# Patient Record
Sex: FEMALE | NOT HISPANIC OR LATINO
[De-identification: names, ages, dates, MRNs, and addresses within clinical notes are randomized per-mention and may not be internally consistent; named-entity substitution may affect disease eponyms.]

---

## 2017-10-02 ENCOUNTER — CHARTING TRANS (OUTPATIENT)
Dept: OTHER | Age: 27
End: 2017-10-02

## 2017-10-23 ENCOUNTER — CHARTING TRANS (OUTPATIENT)
Dept: OTHER | Age: 27
End: 2017-10-23

## 2017-10-24 ENCOUNTER — CHARTING TRANS (OUTPATIENT)
Dept: OTHER | Age: 27
End: 2017-10-24

## 2017-11-07 ENCOUNTER — LAB SERVICES (OUTPATIENT)
Dept: OTHER | Age: 27
End: 2017-11-07

## 2017-11-07 ENCOUNTER — CHARTING TRANS (OUTPATIENT)
Dept: OTHER | Age: 27
End: 2017-11-07

## 2017-11-14 LAB — AP REPORT: NORMAL

## 2018-01-22 ENCOUNTER — CHARTING TRANS (OUTPATIENT)
Dept: OTHER | Age: 28
End: 2018-01-22

## 2018-11-27 VITALS
BODY MASS INDEX: 22.34 KG/M2 | SYSTOLIC BLOOD PRESSURE: 118 MMHG | DIASTOLIC BLOOD PRESSURE: 78 MMHG | WEIGHT: 139 LBS | HEIGHT: 66 IN

## 2021-03-11 LAB — AMB EXT TREPONEMAL ANTIBODIES: NONREACTIVE

## 2021-05-20 ENCOUNTER — TELEPHONE (OUTPATIENT)
Dept: OBGYN UNIT | Facility: HOSPITAL | Age: 31
End: 2021-05-20

## 2021-05-21 ENCOUNTER — LAB ENCOUNTER (OUTPATIENT)
Dept: LAB | Age: 31
End: 2021-05-21
Attending: OBSTETRICS & GYNECOLOGY
Payer: COMMERCIAL

## 2021-05-21 DIAGNOSIS — Z34.80 SUPERVISION OF OTHER NORMAL PREGNANCY: ICD-10-CM

## 2021-05-24 ENCOUNTER — ANESTHESIA EVENT (OUTPATIENT)
Dept: OBGYN UNIT | Facility: HOSPITAL | Age: 31
End: 2021-05-24
Payer: COMMERCIAL

## 2021-05-24 ENCOUNTER — HOSPITAL ENCOUNTER (INPATIENT)
Facility: HOSPITAL | Age: 31
LOS: 2 days | Discharge: HOME | End: 2021-05-26
Attending: OBSTETRICS & GYNECOLOGY | Admitting: OBSTETRICS & GYNECOLOGY
Payer: COMMERCIAL

## 2021-05-24 ENCOUNTER — APPOINTMENT (OUTPATIENT)
Dept: OBGYN CLINIC | Facility: HOSPITAL | Age: 31
End: 2021-05-24
Attending: OBSTETRICS & GYNECOLOGY
Payer: COMMERCIAL

## 2021-05-24 ENCOUNTER — ANESTHESIA (OUTPATIENT)
Dept: OBGYN UNIT | Facility: HOSPITAL | Age: 31
End: 2021-05-24
Payer: COMMERCIAL

## 2021-05-24 ENCOUNTER — TELEPHONE (OUTPATIENT)
Dept: OBGYN UNIT | Facility: HOSPITAL | Age: 31
End: 2021-05-24

## 2021-05-24 PROBLEM — Z34.90 PREGNANCY: Status: ACTIVE | Noted: 2021-05-24

## 2021-05-24 PROBLEM — Z34.90 PREGNANCY (HCC): Status: ACTIVE | Noted: 2021-05-24

## 2021-05-24 PROCEDURE — 86901 BLOOD TYPING SEROLOGIC RH(D): CPT | Performed by: OBSTETRICS & GYNECOLOGY

## 2021-05-24 PROCEDURE — 0KQM0ZZ REPAIR PERINEUM MUSCLE, OPEN APPROACH: ICD-10-PCS | Performed by: OBSTETRICS & GYNECOLOGY

## 2021-05-24 PROCEDURE — 86900 BLOOD TYPING SEROLOGIC ABO: CPT | Performed by: OBSTETRICS & GYNECOLOGY

## 2021-05-24 PROCEDURE — 3E033VJ INTRODUCTION OF OTHER HORMONE INTO PERIPHERAL VEIN, PERCUTANEOUS APPROACH: ICD-10-PCS | Performed by: OBSTETRICS & GYNECOLOGY

## 2021-05-24 PROCEDURE — 85025 COMPLETE CBC W/AUTO DIFF WBC: CPT | Performed by: OBSTETRICS & GYNECOLOGY

## 2021-05-24 PROCEDURE — 86850 RBC ANTIBODY SCREEN: CPT | Performed by: OBSTETRICS & GYNECOLOGY

## 2021-05-24 PROCEDURE — 10907ZC DRAINAGE OF AMNIOTIC FLUID, THERAPEUTIC FROM PRODUCTS OF CONCEPTION, VIA NATURAL OR ARTIFICIAL OPENING: ICD-10-PCS | Performed by: OBSTETRICS & GYNECOLOGY

## 2021-05-24 RX ORDER — ACETAMINOPHEN 500 MG
500 TABLET ORAL EVERY 6 HOURS PRN
Status: DISCONTINUED | OUTPATIENT
Start: 2021-05-24 | End: 2021-05-24

## 2021-05-24 RX ORDER — DOCUSATE SODIUM 100 MG/1
100 CAPSULE, LIQUID FILLED ORAL 2 TIMES DAILY
Status: DISCONTINUED | OUTPATIENT
Start: 2021-05-25 | End: 2021-05-26

## 2021-05-24 RX ORDER — DEXTROSE, SODIUM CHLORIDE, SODIUM LACTATE, POTASSIUM CHLORIDE, AND CALCIUM CHLORIDE 5; .6; .31; .03; .02 G/100ML; G/100ML; G/100ML; G/100ML; G/100ML
INJECTION, SOLUTION INTRAVENOUS CONTINUOUS
Status: DISCONTINUED | OUTPATIENT
Start: 2021-05-24 | End: 2021-05-24 | Stop reason: HOSPADM

## 2021-05-24 RX ORDER — LIDOCAINE HYDROCHLORIDE 10 MG/ML
INJECTION, SOLUTION INFILTRATION; PERINEURAL
Status: COMPLETED | OUTPATIENT
Start: 2021-05-24 | End: 2021-05-24

## 2021-05-24 RX ORDER — DIAPER,BRIEF,INFANT-TODD,DISP
1 EACH MISCELLANEOUS EVERY 6 HOURS PRN
Status: DISCONTINUED | OUTPATIENT
Start: 2021-05-24 | End: 2021-05-26

## 2021-05-24 RX ORDER — IBUPROFEN 600 MG/1
600 TABLET ORAL EVERY 6 HOURS PRN
Status: DISCONTINUED | OUTPATIENT
Start: 2021-05-24 | End: 2021-05-24

## 2021-05-24 RX ORDER — ONDANSETRON 2 MG/ML
4 INJECTION INTRAMUSCULAR; INTRAVENOUS EVERY 6 HOURS PRN
Status: DISCONTINUED | OUTPATIENT
Start: 2021-05-24 | End: 2021-05-24

## 2021-05-24 RX ORDER — BISACODYL 10 MG
10 SUPPOSITORY, RECTAL RECTAL ONCE AS NEEDED
Status: DISCONTINUED | OUTPATIENT
Start: 2021-05-24 | End: 2021-05-26

## 2021-05-24 RX ORDER — BUPIVACAINE HCL/0.9 % NACL/PF 0.25 %
5 PLASTIC BAG, INJECTION (ML) EPIDURAL AS NEEDED
Status: DISCONTINUED | OUTPATIENT
Start: 2021-05-24 | End: 2021-05-24

## 2021-05-24 RX ORDER — CHOLECALCIFEROL (VITAMIN D3) 25 MCG
1 TABLET,CHEWABLE ORAL DAILY
Status: DISCONTINUED | OUTPATIENT
Start: 2021-05-25 | End: 2021-05-26

## 2021-05-24 RX ORDER — NALBUPHINE HCL 10 MG/ML
2.5 AMPUL (ML) INJECTION
Status: DISCONTINUED | OUTPATIENT
Start: 2021-05-24 | End: 2021-05-24

## 2021-05-24 RX ORDER — BUPIVACAINE HYDROCHLORIDE 2.5 MG/ML
20 INJECTION, SOLUTION EPIDURAL; INFILTRATION; INTRACAUDAL ONCE
Status: DISCONTINUED | OUTPATIENT
Start: 2021-05-24 | End: 2021-05-24 | Stop reason: HOSPADM

## 2021-05-24 RX ORDER — AMMONIA INHALANTS 0.04 G/.3ML
0.3 INHALANT RESPIRATORY (INHALATION) AS NEEDED
Status: DISCONTINUED | OUTPATIENT
Start: 2021-05-24 | End: 2021-05-24 | Stop reason: HOSPADM

## 2021-05-24 RX ORDER — TRISODIUM CITRATE DIHYDRATE AND CITRIC ACID MONOHYDRATE 500; 334 MG/5ML; MG/5ML
30 SOLUTION ORAL AS NEEDED
Status: DISCONTINUED | OUTPATIENT
Start: 2021-05-24 | End: 2021-05-24 | Stop reason: HOSPADM

## 2021-05-24 RX ORDER — IBUPROFEN 600 MG/1
600 TABLET ORAL EVERY 6 HOURS
Status: DISCONTINUED | OUTPATIENT
Start: 2021-05-24 | End: 2021-05-24

## 2021-05-24 RX ORDER — ACETAMINOPHEN 325 MG/1
650 TABLET ORAL EVERY 6 HOURS PRN
Status: DISCONTINUED | OUTPATIENT
Start: 2021-05-24 | End: 2021-05-26

## 2021-05-24 RX ORDER — LIDOCAINE HYDROCHLORIDE 10 MG/ML
30 INJECTION, SOLUTION EPIDURAL; INFILTRATION; INTRACAUDAL; PERINEURAL ONCE
Status: DISCONTINUED | OUTPATIENT
Start: 2021-05-24 | End: 2021-05-24 | Stop reason: HOSPADM

## 2021-05-24 RX ORDER — SODIUM CHLORIDE, SODIUM LACTATE, POTASSIUM CHLORIDE, CALCIUM CHLORIDE 600; 310; 30; 20 MG/100ML; MG/100ML; MG/100ML; MG/100ML
INJECTION, SOLUTION INTRAVENOUS AS NEEDED
Status: DISCONTINUED | OUTPATIENT
Start: 2021-05-24 | End: 2021-05-24 | Stop reason: HOSPADM

## 2021-05-24 RX ORDER — AMMONIA INHALANTS 0.04 G/.3ML
0.3 INHALANT RESPIRATORY (INHALATION) AS NEEDED
Status: DISCONTINUED | OUTPATIENT
Start: 2021-05-24 | End: 2021-05-26

## 2021-05-24 RX ORDER — SIMETHICONE 80 MG
80 TABLET,CHEWABLE ORAL 3 TIMES DAILY PRN
Status: DISCONTINUED | OUTPATIENT
Start: 2021-05-24 | End: 2021-05-26

## 2021-05-24 RX ORDER — ONDANSETRON 2 MG/ML
4 INJECTION INTRAMUSCULAR; INTRAVENOUS EVERY 6 HOURS PRN
Status: DISCONTINUED | OUTPATIENT
Start: 2021-05-24 | End: 2021-05-26

## 2021-05-24 RX ORDER — IBUPROFEN 600 MG/1
600 TABLET ORAL EVERY 6 HOURS PRN
Status: DISCONTINUED | OUTPATIENT
Start: 2021-05-24 | End: 2021-05-26

## 2021-05-24 RX ORDER — LIDOCAINE HYDROCHLORIDE AND EPINEPHRINE 15; 5 MG/ML; UG/ML
INJECTION, SOLUTION EPIDURAL
Status: COMPLETED | OUTPATIENT
Start: 2021-05-24 | End: 2021-05-24

## 2021-05-24 RX ORDER — BUPIVACAINE HYDROCHLORIDE 2.5 MG/ML
INJECTION, SOLUTION EPIDURAL; INFILTRATION; INTRACAUDAL
Status: COMPLETED | OUTPATIENT
Start: 2021-05-24 | End: 2021-05-24

## 2021-05-24 RX ORDER — TERBUTALINE SULFATE 1 MG/ML
0.25 INJECTION, SOLUTION SUBCUTANEOUS AS NEEDED
Status: DISCONTINUED | OUTPATIENT
Start: 2021-05-24 | End: 2021-05-24 | Stop reason: HOSPADM

## 2021-05-24 RX ORDER — DOCUSATE SODIUM 100 MG/1
100 CAPSULE, LIQUID FILLED ORAL
Status: DISCONTINUED | OUTPATIENT
Start: 2021-05-24 | End: 2021-05-24

## 2021-05-24 RX ADMIN — LIDOCAINE HYDROCHLORIDE 5 ML: 10 INJECTION, SOLUTION INFILTRATION; PERINEURAL at 11:45:00

## 2021-05-24 RX ADMIN — BUPIVACAINE HYDROCHLORIDE 10 ML: 2.5 INJECTION, SOLUTION EPIDURAL; INFILTRATION; INTRACAUDAL at 11:45:00

## 2021-05-24 RX ADMIN — LIDOCAINE HYDROCHLORIDE AND EPINEPHRINE 5 ML: 15; 5 INJECTION, SOLUTION EPIDURAL at 11:45:00

## 2021-05-24 NOTE — PROGRESS NOTES
Pt is a 27year old female admitted to [facility]  . Patient presents with:  Scheduled Induction: Elective     Pt is  39w0d intra-uterine pregnancy. History obtained, consents signed. Oriented to room, staff, and plan of care.

## 2021-05-24 NOTE — ANESTHESIA PREPROCEDURE EVALUATION
Anesthesia PreOp Note    HPI:     Skinny Augustine is a 27year old female who presents for preoperative consultation requested by: * No surgeons listed *    Date of Surgery: 5/24/2021    * No procedures listed *  Indication: * No pre-op diagnosis entere Stopped at 05/24/21 0815  fentaNYL citrate (SUBLIMAZE) 0.05 MG/ML injection 50 mcg, 50 mcg, Intravenous, Q30 Min PRN, Bonnie Sullivan MD  oxyTOCIN (PITOCIN) 30 units/ 500 ml 0.9% NS premix infusion, 0.5-20 dipak-units/min, Intravenous, Continuous, Rodríguez Needs:       Lack of Transportation (Medical):       Lack of Transportation (Non-Medical):   Physical Activity:       Days of Exercise per Week:       Minutes of Exercise per Session:   Stress:       Feeling of Stress :   Social Connections:       Luma Velasquez Plan:   Epidural  Post-op Pain Management: Oral pain medication  Informed Consent Plan and Risks Discussed With:  Patient  Use of Blood Products Discussed With:  Patient  Blood Product Use Consented    Discussed plan with:  Surgeon      I have informed C

## 2021-05-24 NOTE — H&P
[address]  [address] JOCELYN Penny Patient Status:  Inpatient    10/25/1990 MRN [id number]   Location [address] Attending Lacey Rodriguez MD   Hosp Day # 0 PCP None Pcp     Date of Admiss (from the past 24 hour(s)). No results found. Assessment/Plan:   IUP 39w0d for IOL  Obstetrical history significant for IOL  Treatment Plan:  Ambulate, monitored., Expectant management. and Anticipate vaginal delivery. Pitocin IOL.  Epidural when

## 2021-05-24 NOTE — ANESTHESIA PROCEDURE NOTES
Labor Analgesia    Date/Time: 5/24/2021 11:45 AM  Performed by: Dianne Dejesus MD  Authorized by: Dianne Dejesus MD       General Information and Staff    Start Time:  5/24/2021 11:35 AM  End Time:  5/24/2021 11:45 AM  Anesthesiologist:  Joanne Guallpa

## 2021-05-25 PROCEDURE — 85025 COMPLETE CBC W/AUTO DIFF WBC: CPT | Performed by: OBSTETRICS & GYNECOLOGY

## 2021-05-25 NOTE — PROGRESS NOTES
[facility]    OB/GYNE Progress Note      Don James Patient Status:  Inpatient    10/25/1990 MRN [id number]   Location [facility] 3SE Attending Garry Betancorut MD   Hosp Day # 1 PCP None Pcp       Assessment/Plan       A

## 2021-05-25 NOTE — PLAN OF CARE
Problem: PAIN - ADULT  Goal: Verbalizes/displays adequate comfort level or patient's stated pain goal  Description: INTERVENTIONS:  - Encourage pt to monitor pain and request assistance  - Assess pain using appropriate pain scale  - Administer analgesics INTERVENTIONS:  - Assess and monitor vital signs and lab values. - Assess fundus and lochia. - Provide ice/sitz baths for perineum discomfort.   - Monitor healing of incision/episiotomy/laceration, and assess for signs and symptoms of infection and hemato expression (breast pumping) and storage of breast milk. Provide pumping equipment/supplies, instructions and assistance, as needed. - Encourage rooming-in and breast feeding on demand.  - Encourage skin-to-skin contact. - Provide LC support as needed.   -

## 2021-05-25 NOTE — PROGRESS NOTES
Patient received into room  351. Bedside report received from  [address] [place] [address]. Patient transferred to bed from cart. Bed in locked and low position. Side rails x2 up. Vital signs within normal limits, fundus firm U/U, lochia  small, no clots noted.  IV site [place]

## 2021-05-25 NOTE — L&D DELIVERY NOTE
Nahid Dover, Girl [[id number]]    Labor Events     labor?: No   steroids?: None  Antibiotics received during labor?: No  Antibiotics (enter # doses in comment): none  Rupture date/time: 2021 1229     Rupture type: SROM, AROM  Fluid color: Grimace Cry or active withdrawal    Muscle tone Limp Some flexion Active motion    Respiratory effort Absent Weak cry; hypoventilation Good, crying              1 Minute:  5 Minute:  10 Minute:  15 Minute:  20 Minute:    Skin color: 1  1       Heart rate: Verified    Maternal Anesthesia: epidural     Episiotomy/Laceration Repair  Laceration: perineal second degree    Delivery Complications  none    Neonatologist Present: no    Delivery Narrative: Patient pushed for 5 minutes prior to delivering a live femal

## 2021-05-25 NOTE — ANESTHESIA POSTPROCEDURE EVALUATION
Patient: Kristie Lundberg    Procedure Summary     Date: 05/24/21 Room / Location:     Anesthesia Start: [facility] Anesthesia Stop: 1819    Procedure: LABOR ANALGESIA Diagnosis:     Scheduled Providers:  Anesthesiologist: Tristin Hanson MD    Anesthesia

## 2021-05-25 NOTE — PROGRESS NOTES
Patient up to bathroom with assist x 2. Voided 250cc. Patient transferred to mother/baby room 351 per wheelchair in stable condition with baby and personal belongings. Accompanied by significant other and staff. Report given to [facility].

## 2021-05-26 VITALS
TEMPERATURE: 98 F | DIASTOLIC BLOOD PRESSURE: 73 MMHG | SYSTOLIC BLOOD PRESSURE: 109 MMHG | HEART RATE: 75 BPM | OXYGEN SATURATION: 100 % | RESPIRATION RATE: 16 BRPM

## 2021-05-26 RX ORDER — IBUPROFEN 600 MG/1
600 TABLET ORAL EVERY 6 HOURS PRN
Qty: 60 TABLET | Refills: 0 | Status: SHIPPED | OUTPATIENT
Start: 2021-05-26 | End: 2021-07-08

## 2021-05-26 RX ORDER — ACETAMINOPHEN 325 MG/1
650 TABLET ORAL EVERY 6 HOURS PRN
Qty: 60 TABLET | Refills: 0 | Status: SHIPPED | OUTPATIENT
Start: 2021-05-26 | End: 2021-07-08

## 2021-05-26 NOTE — PLAN OF CARE
Pain well controlled with motrin. Vitals wnl. Possible discharge tomorrow. Will continue plan of care.

## 2021-05-26 NOTE — PROGRESS NOTES
[place] [facility]    OB/GYNE Progress Note      [place] Patient Status:  Inpatient    10/25/1990 MRN [id number]   Location [facility] 3SE Attending Johnnie Gracia MD   Hosp Day # 2 PCP None Pcp       Assessment/Plan       A

## 2021-05-26 NOTE — DISCHARGE SUMMARY
[facility]    Discharge Summary    Gloria Hankins Patient Status:  Inpatient    10/25/1990 MRN [id number]   Location [facility] 3SE Attending Radha Felipe MD   Hosp Day # 2 PCP None Pcp     Date of Admission: 2021

## 2021-05-26 NOTE — PLAN OF CARE
Problem: POSTPARTUM  Goal: Long Term Goal:Experiences normal postpartum course  Description: INTERVENTIONS:  - Assess and monitor vital signs and lab values. - Assess fundus and lochia. - Provide ice/sitz baths for perineum discomfort.   - Monitor heali for signs of nipple pain/trauma. - Instruct and provide assistance with proper latch. - Review techniques for milk expression (breast pumping) and storage of breast milk. Provide pumping equipment/supplies, instructions and assistance, as needed.   Leighton Henry

## 2021-06-07 ENCOUNTER — TELEPHONE (OUTPATIENT)
Dept: OBGYN UNIT | Facility: HOSPITAL | Age: 31
End: 2021-06-07

## 2023-07-28 ENCOUNTER — OFFICE VISIT (OUTPATIENT)
Dept: OBGYN CLINIC | Facility: CLINIC | Age: 33
End: 2023-07-28

## 2023-07-28 VITALS
HEART RATE: 101 BPM | DIASTOLIC BLOOD PRESSURE: 69 MMHG | HEIGHT: 66 IN | BODY MASS INDEX: 24.11 KG/M2 | SYSTOLIC BLOOD PRESSURE: 102 MMHG | WEIGHT: 150 LBS

## 2023-07-28 DIAGNOSIS — N92.6 MISSED MENSES: Primary | ICD-10-CM

## 2023-07-28 PROBLEM — Z78.9 NO KNOWN PROBLEMS: Status: ACTIVE | Noted: 2023-07-28

## 2023-07-28 LAB
CONTROL LINE PRESENT WITH A CLEAR BACKGROUND (YES/NO): YES YES/NO
PREGNANCY TEST, URINE: POSITIVE

## 2023-07-28 PROCEDURE — 3078F DIAST BP <80 MM HG: CPT | Performed by: ADVANCED PRACTICE MIDWIFE

## 2023-07-28 PROCEDURE — 3074F SYST BP LT 130 MM HG: CPT | Performed by: ADVANCED PRACTICE MIDWIFE

## 2023-07-28 PROCEDURE — 81025 URINE PREGNANCY TEST: CPT | Performed by: ADVANCED PRACTICE MIDWIFE

## 2023-07-28 PROCEDURE — 99203 OFFICE O/P NEW LOW 30 MIN: CPT | Performed by: ADVANCED PRACTICE MIDWIFE

## 2023-07-28 PROCEDURE — 3008F BODY MASS INDEX DOCD: CPT | Performed by: ADVANCED PRACTICE MIDWIFE

## 2023-07-28 RX ORDER — CHOLECALCIFEROL (VITAMIN D3) 25 MCG
1 TABLET,CHEWABLE ORAL DAILY
COMMUNITY

## 2023-07-28 NOTE — PROGRESS NOTES
Patient seen in conjunction with [facility]. I personally witnessed the patient's exam and medical decision making on this date of service. I was physically present in the room for the performance of the E/M service. I have reviewed the CNM student's documentation and findings including history, Exam, and Medical Decision Making, edited the document for accuracy and verify that it represents the clinical findings and services performed.

## 2023-07-28 NOTE — PROGRESS NOTES
CHIEF COMPLAINT:  Patient presents with:  Consult: Missed menses , LMP  23, CLIFF 24        HPI:  Franciso Aschoff is 28year old, female, here today for a missed menses visit. Currently, she is feeling well. Denies 1st trimester danger signs. She is interested to learn about the midwife model of care as she previously saw an OB GYN for her past two pregnancies and births. Desires non-interventive birth if possible. Patient's last menstrual period was 2023.   Menarche: 15  Period Cycle (Days): 28 DAYS  Period Duration (Days): 6 DAYS  Period Flow: MODERATE  Use of Birth Control (if yes, specify type): None  Hx Prior Abnormal Pap: No  Pap Date: 20  Pap Result Notes: NORMAL RESULTS       3/27/2019-39w2d 7#4oz, vaginal, epidural  2021-39w0d 6#8oz , vaginal, epidural  Current: Denies 1T danger signs  LMP 2023 --> 6w4d --> CLIFF , certain LMP, regular cycles    Denies history of gestational hypertension, diabetes, hemorrhage, or shoulder dystocia    /FOB: [place]   Family H/O of genetic disorders: No  Cats at home: No   Occupational hazzards: No  H/O of STIs: No  H/O of chicken pox or vaccine: Yes, vaccine  Exercise: Yes- Hot yoga three times per week  History of MRSA or other difficult to treat infections: No   Recent Travel outside [place] No    HISTORY:  Past Medical History:   Diagnosis Date    Precancerous skin lesion 2015      Past Surgical History:   Procedure Laterality Date    BIOPSY OF SKIN LESION        Family History   Problem Relation Age of Onset    Cancer Mother     Diabetes Maternal Grandfather       Social History:   Social History     Socioeconomic History    Marital status:    Tobacco Use    Smoking status: Never    Smokeless tobacco: Never   Vaping Use    Vaping Use: Never used   Substance and Sexual Activity    Alcohol use: Not Currently    Drug use: Never    Sexual activity: Yes       Safe relationship/safe home environment Medications (Active prior to today's visit):  Current Outpatient Medications   Medication Sig Dispense Refill    prenatal vitamin with DHA 27-0.8-228 MG Oral Cap Take 1 capsule by mouth daily. Levonorgestrel-Ethinyl Estrad (AVIANE) 0.1-20 MG-MCG Oral Tab Take 1 tablet by mouth daily. 90 tablet 5    cetirizine HCl 1 MG/ML Oral Solution Take 5 mg by mouth daily. Allergies:  No Known Allergies    REVIEW OF SYSTEMS:  Review of Systems   Constitutional: Negative. Respiratory: Negative. Endocrine: Negative. Genitourinary: Negative. Skin: Negative. Neurological: Negative. PHYSICAL EXAM:   07/28/23  1211   BP: 102/69   Pulse: 101     Physical Exam  Vitals reviewed. Constitutional:       Appearance: Normal appearance. HENT:      Head: Normocephalic. Pulmonary:      Effort: Pulmonary effort is normal.   Neurological:      Mental Status: She is alert and oriented to person, place, and time. Psychiatric:         Behavior: Behavior normal.         Thought Content: Thought content normal.         Judgment: Judgment normal.          Recent Results (from the past 24 hour(s))   URINE PREGNANCY TEST    Collection Time: 07/28/23 12:10 PM   Result Value Ref Range    Pregnancy Test, Urine POSITIVE     Control Line Present with a clear background (yes/no) YES Yes/No    Kit Lot # [id number] Numeric    Kit Expiration Date 07/12/24 Date        ASSESSMENT/PLAN:   Cami Rose was seen today for consult. Diagnoses and all orders for this visit:    Missed menses  -     URINE PREGNANCY TEST       Today's UCG positive result reviewed with patient  Appropriate for CNM care   Already taking prenatal vitamins  Baby ASA not indicated.  Low risk for pre-eclampsia    Next visit: Patient to schedule Nurse Education visit, next available, and NOB for 12wga    Counseling included:  -- CNM care, philosophy, and protocols  -- Discussed importance of folic acid, calcium, vitamin D  -- Reviewed pregnancy recommendations regarding weight gain, diet/hydration, and food safety  -- Travel discussed, avoid travel to zika zones, use of support stockings with flights longer than 3 hours, movement every 2-3 hours if driving  -- Discussed exercise  -- Discussed avoidance of Jacuzzis, saunas, hot tubs and steam rooms  -- Discussed avoidance of alcohol, smoking, and minimizing caffeine  -- Warning signs reviewed. Advised to call office if occur. Safe use of [employer] for messaging  -- Reviewed genetic/chromosomal screening guidelines for pregnancies, patient plans for First Trimester Screening  -- Schedule RN OB ED visit   -- 30 minutes face to face counseling, chart review, orders and coordination of care    Pt verbalized understanding. All questions answered. No barriers to learning identified      WILMAR Newell under direct supervision of Sabino Underwood CNM. Patient seen in conjunction with WILMAR. I personally witnessed the patient's exam and medical decision making on this date of service. I was physically present in the room for the performance of the E/M service. I have reviewed the MANDI student's documentation and findings including history, Exam, and Medical Decision Making, edited the document for accuracy and verify that it represents the clinical findings and services performed.

## 2023-08-15 ENCOUNTER — NURSE ONLY (OUTPATIENT)
Dept: OBGYN CLINIC | Facility: CLINIC | Age: 33
End: 2023-08-15

## 2023-08-15 ENCOUNTER — TELEPHONE (OUTPATIENT)
Dept: OBGYN CLINIC | Facility: CLINIC | Age: 33
End: 2023-08-15

## 2023-08-15 DIAGNOSIS — Z34.81 ENCOUNTER FOR SUPERVISION OF OTHER NORMAL PREGNANCY IN FIRST TRIMESTER: Primary | ICD-10-CM

## 2023-08-15 NOTE — PROGRESS NOTES
Phone Nurse Education Completed  PT verbalized understanding     Orders placed for new OB labs       BMI-20.3  Genetic Testing-  Desires first trimester screen with mfm/ NIPT  Circumcision- Desires  Feeding- Undecided  Transfusion- Agreeable  EPDS-0  Type of birth-  Desires no epidural  How [place] [facility]-  Facebook Group    Consults Placed- MFM     Pt. Has answered NO 5P questions and has NO  risk factors. Pt. Given What pregnant women need to know handout.

## 2023-09-04 ENCOUNTER — LAB ENCOUNTER (OUTPATIENT)
Dept: LAB | Facility: HOSPITAL | Age: 33
End: 2023-09-04
Attending: ADVANCED PRACTICE MIDWIFE
Payer: COMMERCIAL

## 2023-09-04 DIAGNOSIS — Z34.81 ENCOUNTER FOR SUPERVISION OF OTHER NORMAL PREGNANCY IN FIRST TRIMESTER: ICD-10-CM

## 2023-09-04 LAB
ANTIBODY SCREEN: NEGATIVE
BASOPHILS # BLD AUTO: 0.04 X10(3) UL (ref 0–0.2)
BASOPHILS NFR BLD AUTO: 0.5 %
DEPRECATED RDW RBC AUTO: 44.6 FL (ref 35.1–46.3)
EOSINOPHIL # BLD AUTO: 0.16 X10(3) UL (ref 0–0.7)
EOSINOPHIL NFR BLD AUTO: 2.1 %
ERYTHROCYTE [DISTWIDTH] IN BLOOD BY AUTOMATED COUNT: 13.3 % (ref 11–15)
HBV SURFACE AG SER-ACNC: <0.1 [IU]/L
HBV SURFACE AG SERPL QL IA: NONREACTIVE
HCT VFR BLD AUTO: 38.3 %
HCV AB SERPL QL IA: NONREACTIVE
HGB BLD-MCNC: 12.8 G/DL
IMM GRANULOCYTES # BLD AUTO: 0.03 X10(3) UL (ref 0–1)
IMM GRANULOCYTES NFR BLD: 0.4 %
LYMPHOCYTES # BLD AUTO: 1.51 X10(3) UL (ref 1–4)
LYMPHOCYTES NFR BLD AUTO: 19.6 %
MCH RBC QN AUTO: 30.7 PG (ref 26–34)
MCHC RBC AUTO-ENTMCNC: 33.4 G/DL (ref 31–37)
MCV RBC AUTO: 91.8 FL
MONOCYTES # BLD AUTO: 0.42 X10(3) UL (ref 0.1–1)
MONOCYTES NFR BLD AUTO: 5.5 %
NEUTROPHILS # BLD AUTO: 5.53 X10 (3) UL (ref 1.5–7.7)
NEUTROPHILS # BLD AUTO: 5.53 X10(3) UL (ref 1.5–7.7)
NEUTROPHILS NFR BLD AUTO: 71.9 %
PLATELET # BLD AUTO: 134 10(3)UL (ref 150–450)
RBC # BLD AUTO: 4.17 X10(6)UL
RH BLOOD TYPE: POSITIVE
RUBV IGG SER QL: POSITIVE
RUBV IGG SER-ACNC: 26.1 IU/ML (ref 10–?)
T PALLIDUM AB SER QL: NEGATIVE
VZV IGG SER IA-ACNC: 131.3 (ref 165–?)
WBC # BLD AUTO: 7.7 X10(3) UL (ref 4–11)

## 2023-09-04 PROCEDURE — 86780 TREPONEMA PALLIDUM: CPT

## 2023-09-04 PROCEDURE — 86900 BLOOD TYPING SEROLOGIC ABO: CPT

## 2023-09-04 PROCEDURE — 83020 HEMOGLOBIN ELECTROPHORESIS: CPT

## 2023-09-04 PROCEDURE — 36415 COLL VENOUS BLD VENIPUNCTURE: CPT

## 2023-09-04 PROCEDURE — 87086 URINE CULTURE/COLONY COUNT: CPT

## 2023-09-04 PROCEDURE — 86803 HEPATITIS C AB TEST: CPT

## 2023-09-04 PROCEDURE — 87340 HEPATITIS B SURFACE AG IA: CPT

## 2023-09-04 PROCEDURE — 86762 RUBELLA ANTIBODY: CPT

## 2023-09-04 PROCEDURE — 86787 VARICELLA-ZOSTER ANTIBODY: CPT

## 2023-09-04 PROCEDURE — 85025 COMPLETE CBC W/AUTO DIFF WBC: CPT

## 2023-09-04 PROCEDURE — 87389 HIV-1 AG W/HIV-1&-2 AB AG IA: CPT

## 2023-09-04 PROCEDURE — 86901 BLOOD TYPING SEROLOGIC RH(D): CPT

## 2023-09-04 PROCEDURE — 83021 HEMOGLOBIN CHROMOTOGRAPHY: CPT

## 2023-09-04 PROCEDURE — 86850 RBC ANTIBODY SCREEN: CPT

## 2023-09-05 PROBLEM — Z28.39 MATERNAL VARICELLA, NON-IMMUNE: Status: ACTIVE | Noted: 2023-09-05

## 2023-09-05 PROBLEM — O09.899 MATERNAL VARICELLA, NON-IMMUNE: Status: ACTIVE | Noted: 2023-09-05

## 2023-09-05 PROBLEM — Z28.39 MATERNAL VARICELLA, NON-IMMUNE (HCC): Status: ACTIVE | Noted: 2023-09-05

## 2023-09-05 PROBLEM — O09.899 MATERNAL VARICELLA, NON-IMMUNE (HCC): Status: ACTIVE | Noted: 2023-09-05

## 2023-09-05 PROBLEM — D69.6 THROMBOCYTOPENIA (HCC): Status: ACTIVE | Noted: 2023-09-05

## 2023-09-06 LAB
HGB A2 MFR BLD: 2.8 % (ref 1.5–3.5)
HGB PNL BLD ELPH: 97.2 % (ref 95.5–100)

## 2023-09-07 ENCOUNTER — INITIAL PRENATAL (OUTPATIENT)
Dept: OBGYN CLINIC | Facility: CLINIC | Age: 33
End: 2023-09-07

## 2023-09-07 VITALS
BODY MASS INDEX: 22 KG/M2 | WEIGHT: 136 LBS | HEART RATE: 105 BPM | DIASTOLIC BLOOD PRESSURE: 72 MMHG | SYSTOLIC BLOOD PRESSURE: 115 MMHG

## 2023-09-07 DIAGNOSIS — Z34.81 ENCOUNTER FOR SUPERVISION OF OTHER NORMAL PREGNANCY IN FIRST TRIMESTER: Primary | ICD-10-CM

## 2023-09-07 DIAGNOSIS — Z11.3 SCREEN FOR STD (SEXUALLY TRANSMITTED DISEASE): ICD-10-CM

## 2023-09-07 DIAGNOSIS — O26.859 SPOTTING DURING PREGNANCY: ICD-10-CM

## 2023-09-07 PROCEDURE — 3074F SYST BP LT 130 MM HG: CPT | Performed by: ADVANCED PRACTICE MIDWIFE

## 2023-09-07 PROCEDURE — 3078F DIAST BP <80 MM HG: CPT | Performed by: ADVANCED PRACTICE MIDWIFE

## 2023-09-08 ENCOUNTER — HOSPITAL ENCOUNTER (OUTPATIENT)
Dept: PERINATAL CARE | Facility: HOSPITAL | Age: 33
Discharge: HOME | End: 2023-09-08
Attending: ADVANCED PRACTICE MIDWIFE
Payer: COMMERCIAL

## 2023-09-08 ENCOUNTER — HOSPITAL ENCOUNTER (OUTPATIENT)
Dept: PERINATAL CARE | Facility: HOSPITAL | Age: 33
Discharge: HOME | End: 2023-09-08
Attending: OBSTETRICS & GYNECOLOGY
Payer: COMMERCIAL

## 2023-09-08 VITALS
WEIGHT: 136 LBS | DIASTOLIC BLOOD PRESSURE: 70 MMHG | BODY MASS INDEX: 22 KG/M2 | HEART RATE: 89 BPM | SYSTOLIC BLOOD PRESSURE: 107 MMHG

## 2023-09-08 DIAGNOSIS — D69.6 THROMBOCYTOPENIA (HCC): ICD-10-CM

## 2023-09-08 DIAGNOSIS — O99.112: Primary | ICD-10-CM

## 2023-09-08 DIAGNOSIS — Z36.9 FIRST TRIMESTER SCREENING: ICD-10-CM

## 2023-09-08 DIAGNOSIS — D69.6: Primary | ICD-10-CM

## 2023-09-08 LAB
C TRACH DNA SPEC QL NAA+PROBE: NEGATIVE
N GONORRHOEA DNA SPEC QL NAA+PROBE: NEGATIVE

## 2023-09-08 PROCEDURE — 36415 COLL VENOUS BLD VENIPUNCTURE: CPT

## 2023-09-08 PROCEDURE — 76813 OB US NUCHAL MEAS 1 GEST: CPT | Performed by: OBSTETRICS & GYNECOLOGY

## 2023-09-09 LAB
GENITAL VAGINOSIS SCREEN: NEGATIVE
TRICHOMONAS SCREEN: NEGATIVE

## 2023-09-09 RX ORDER — CLOTRIMAZOLE 1 %
1 CREAM WITH APPLICATOR VAGINAL DAILY
Qty: 45 G | Refills: 0 | Status: SHIPPED | OUTPATIENT
Start: 2023-09-09 | End: 2023-09-16

## 2023-09-18 ENCOUNTER — TELEPHONE (OUTPATIENT)
Dept: PERINATAL CARE | Facility: HOSPITAL | Age: 33
End: 2023-09-18

## 2023-10-03 ENCOUNTER — LAB ENCOUNTER (OUTPATIENT)
Dept: LAB | Facility: HOSPITAL | Age: 33
End: 2023-10-03
Attending: ADVANCED PRACTICE MIDWIFE
Payer: COMMERCIAL

## 2023-10-03 ENCOUNTER — ROUTINE PRENATAL (OUTPATIENT)
Dept: OBGYN CLINIC | Facility: CLINIC | Age: 33
End: 2023-10-03

## 2023-10-03 VITALS
BODY MASS INDEX: 22 KG/M2 | HEART RATE: 83 BPM | DIASTOLIC BLOOD PRESSURE: 63 MMHG | WEIGHT: 137.38 LBS | SYSTOLIC BLOOD PRESSURE: 93 MMHG

## 2023-10-03 DIAGNOSIS — Z34.81 ENCOUNTER FOR SUPERVISION OF OTHER NORMAL PREGNANCY IN FIRST TRIMESTER: Primary | ICD-10-CM

## 2023-10-03 DIAGNOSIS — Z28.21 INFLUENZA VACCINE REFUSED: ICD-10-CM

## 2023-10-03 DIAGNOSIS — Z34.81 ENCOUNTER FOR SUPERVISION OF OTHER NORMAL PREGNANCY IN FIRST TRIMESTER: ICD-10-CM

## 2023-10-03 PROBLEM — Z78.9 NO KNOWN PROBLEMS: Status: RESOLVED | Noted: 2023-07-28 | Resolved: 2023-10-03

## 2023-10-03 PROCEDURE — 3074F SYST BP LT 130 MM HG: CPT | Performed by: ADVANCED PRACTICE MIDWIFE

## 2023-10-03 PROCEDURE — 82105 ALPHA-FETOPROTEIN SERUM: CPT

## 2023-10-03 PROCEDURE — 3078F DIAST BP <80 MM HG: CPT | Performed by: ADVANCED PRACTICE MIDWIFE

## 2023-10-03 PROCEDURE — 36415 COLL VENOUS BLD VENIPUNCTURE: CPT

## 2023-10-03 NOTE — PROGRESS NOTES
Feeling some FM. Had NT which was normal and low risk CFDNA. AFP ordered. Declines Flu vaccine today. Warning signs reviewed. Has 20 wk sono scheduled. Low plt, reports had in other pregnancies as well and resolved after delivery.

## 2023-10-06 LAB
AFP MOM: 0.63
AFP VALUE: 23 NG/ML
GA ON COLL DATE: 16.1 WEEKS
INSULIN DEP AFP: NO
MAT AGE AT EDD: 33.4 YR
MULTIPLE GEST AFP: NO
OSBR RISK 1 IN AFP: NORMAL
WEIGHT AFP: 137 LBS

## 2023-11-01 ENCOUNTER — HOSPITAL ENCOUNTER (OUTPATIENT)
Dept: PERINATAL CARE | Facility: HOSPITAL | Age: 33
Discharge: HOME | End: 2023-11-01
Attending: OBSTETRICS & GYNECOLOGY
Payer: COMMERCIAL

## 2023-11-01 VITALS
DIASTOLIC BLOOD PRESSURE: 66 MMHG | HEART RATE: 108 BPM | SYSTOLIC BLOOD PRESSURE: 106 MMHG | BODY MASS INDEX: 23 KG/M2 | WEIGHT: 145 LBS

## 2023-11-01 DIAGNOSIS — Z3A.20 PREGNANCY WITH 20 COMPLETED WEEKS GESTATION: ICD-10-CM

## 2023-11-01 DIAGNOSIS — Z36.89 ENCOUNTER FOR FETAL ANATOMIC SURVEY: ICD-10-CM

## 2023-11-01 DIAGNOSIS — D69.6 THROMBOCYTOPENIA (HCC): ICD-10-CM

## 2023-11-01 DIAGNOSIS — Z36.89 ENCOUNTER FOR FETAL ANATOMIC SURVEY: Primary | ICD-10-CM

## 2023-11-01 PROCEDURE — 76811 OB US DETAILED SNGL FETUS: CPT | Performed by: OBSTETRICS & GYNECOLOGY

## 2023-11-07 ENCOUNTER — TELEPHONE (OUTPATIENT)
Dept: OBGYN CLINIC | Facility: CLINIC | Age: 33
End: 2023-11-07

## 2023-11-07 ENCOUNTER — LAB ENCOUNTER (OUTPATIENT)
Dept: LAB | Facility: HOSPITAL | Age: 33
End: 2023-11-07
Attending: ADVANCED PRACTICE MIDWIFE
Payer: COMMERCIAL

## 2023-11-07 ENCOUNTER — ROUTINE PRENATAL (OUTPATIENT)
Dept: OBGYN CLINIC | Facility: CLINIC | Age: 33
End: 2023-11-07
Payer: COMMERCIAL

## 2023-11-07 VITALS
SYSTOLIC BLOOD PRESSURE: 101 MMHG | BODY MASS INDEX: 23 KG/M2 | DIASTOLIC BLOOD PRESSURE: 63 MMHG | WEIGHT: 144 LBS | HEART RATE: 92 BPM

## 2023-11-07 DIAGNOSIS — D69.6 THROMBOCYTOPENIA (HCC): ICD-10-CM

## 2023-11-07 DIAGNOSIS — Z34.80 SUPERVISION OF OTHER NORMAL PREGNANCY, ANTEPARTUM: Primary | ICD-10-CM

## 2023-11-07 LAB
DEPRECATED RDW RBC AUTO: 44.1 FL (ref 35.1–46.3)
ERYTHROCYTE [DISTWIDTH] IN BLOOD BY AUTOMATED COUNT: 13.4 % (ref 11–15)
HCT VFR BLD AUTO: 34.8 %
HGB BLD-MCNC: 11.8 G/DL
MCH RBC QN AUTO: 30.9 PG (ref 26–34)
MCHC RBC AUTO-ENTMCNC: 33.9 G/DL (ref 31–37)
MCV RBC AUTO: 91.1 FL
PLATELET # BLD AUTO: 133 10(3)UL (ref 150–450)
RBC # BLD AUTO: 3.82 X10(6)UL
WBC # BLD AUTO: 12.4 X10(3) UL (ref 4–11)

## 2023-11-07 PROCEDURE — 90471 IMMUNIZATION ADMIN: CPT | Performed by: ADVANCED PRACTICE MIDWIFE

## 2023-11-07 PROCEDURE — 3074F SYST BP LT 130 MM HG: CPT | Performed by: ADVANCED PRACTICE MIDWIFE

## 2023-11-07 PROCEDURE — 90686 IIV4 VACC NO PRSV 0.5 ML IM: CPT | Performed by: ADVANCED PRACTICE MIDWIFE

## 2023-11-07 PROCEDURE — 85027 COMPLETE CBC AUTOMATED: CPT

## 2023-11-07 PROCEDURE — 3078F DIAST BP <80 MM HG: CPT | Performed by: ADVANCED PRACTICE MIDWIFE

## 2023-11-07 PROCEDURE — 36415 COLL VENOUS BLD VENIPUNCTURE: CPT

## 2023-11-07 NOTE — PROGRESS NOTES
Active fetus Denies any complaints. Denies any vaginal bleeding, leaking of fluid or vaginal discharge. Warning signs reviewed  All questions answered.    Thrombocytopenia: CBC today  Ultrasound results and MFM recommendations reviewed with pt

## 2023-11-07 NOTE — TELEPHONE ENCOUNTER
----- Message from Wilmar Tejada CNM sent at 11/7/2023  2:04 PM CST -----  Please call platelets are stable rest of CBC normal for pregnancy

## 2023-11-27 ENCOUNTER — ROUTINE PRENATAL (OUTPATIENT)
Dept: OBGYN CLINIC | Facility: CLINIC | Age: 33
End: 2023-11-27
Payer: COMMERCIAL

## 2023-11-27 VITALS
BODY MASS INDEX: 24 KG/M2 | WEIGHT: 151 LBS | HEART RATE: 80 BPM | DIASTOLIC BLOOD PRESSURE: 68 MMHG | SYSTOLIC BLOOD PRESSURE: 103 MMHG

## 2023-11-27 DIAGNOSIS — Z34.81 ENCOUNTER FOR SUPERVISION OF OTHER NORMAL PREGNANCY IN FIRST TRIMESTER: Primary | ICD-10-CM

## 2023-11-27 DIAGNOSIS — N76.0 VAGINITIS AND VULVOVAGINITIS: ICD-10-CM

## 2023-11-27 PROCEDURE — 3078F DIAST BP <80 MM HG: CPT | Performed by: ADVANCED PRACTICE MIDWIFE

## 2023-11-27 PROCEDURE — 3074F SYST BP LT 130 MM HG: CPT | Performed by: ADVANCED PRACTICE MIDWIFE

## 2023-11-27 NOTE — PROGRESS NOTES
+fm. Has been using antifungal cream and it has been helping. Possible kleber peralta contractions. Discussed PTL danger signs. 3rd trimester labs ordered.

## 2023-12-28 ENCOUNTER — ROUTINE PRENATAL (OUTPATIENT)
Dept: OBGYN CLINIC | Facility: CLINIC | Age: 33
End: 2023-12-28
Payer: COMMERCIAL

## 2023-12-28 VITALS
HEART RATE: 102 BPM | SYSTOLIC BLOOD PRESSURE: 110 MMHG | BODY MASS INDEX: 25 KG/M2 | WEIGHT: 157 LBS | DIASTOLIC BLOOD PRESSURE: 72 MMHG

## 2023-12-28 DIAGNOSIS — Z34.83 ENCOUNTER FOR SUPERVISION OF OTHER NORMAL PREGNANCY IN THIRD TRIMESTER: Primary | ICD-10-CM

## 2023-12-28 PROCEDURE — 90471 IMMUNIZATION ADMIN: CPT | Performed by: ADVANCED PRACTICE MIDWIFE

## 2023-12-28 PROCEDURE — 3074F SYST BP LT 130 MM HG: CPT | Performed by: ADVANCED PRACTICE MIDWIFE

## 2023-12-28 PROCEDURE — 90715 TDAP VACCINE 7 YRS/> IM: CPT | Performed by: ADVANCED PRACTICE MIDWIFE

## 2023-12-28 PROCEDURE — 3078F DIAST BP <80 MM HG: CPT | Performed by: ADVANCED PRACTICE MIDWIFE

## 2023-12-28 NOTE — PROGRESS NOTES
Nasim is a 35year old , at 29w2d, here for her return OB visit. Currently, she is feeling well. Denies contractions, bleeding, and leakage of fluid. Endorses active fetus. She reports last week she had some vaginal itching and irritation so she did an over the counter treatment for 4 days and that resolved. Now just continues to have a lot of discharge but no odor or irritation. She doesn't have time to go to the lab after her visit today but she plans to go tomorrow morning first thing for her 3T labs. Vital signs and weight reviewed  See flowsheets     Today's Assessment/Plan:   Tdap recommendation reviewed and pt accepts. Pt instructed to call if she gets any itching or irritation again so she can come in right away for an exam. She verbalized understanding and agreement. Next visit: Follow up OB: 2 weeks    Reviewed:   3rd trimester precautions and expectations   labor precautions  Kick counts  Danger signs  Prenatal visit schedule    Pt verbalized understanding. All questions answered.  No barriers to learning identified

## 2023-12-29 ENCOUNTER — LAB ENCOUNTER (OUTPATIENT)
Dept: LAB | Facility: HOSPITAL | Age: 33
End: 2023-12-29
Attending: ADVANCED PRACTICE MIDWIFE
Payer: COMMERCIAL

## 2023-12-29 ENCOUNTER — TELEPHONE (OUTPATIENT)
Dept: OBGYN CLINIC | Facility: CLINIC | Age: 33
End: 2023-12-29

## 2023-12-29 DIAGNOSIS — Z34.81 ENCOUNTER FOR SUPERVISION OF OTHER NORMAL PREGNANCY IN FIRST TRIMESTER: ICD-10-CM

## 2023-12-29 DIAGNOSIS — R73.09 ELEVATED GLUCOSE TOLERANCE TEST: Primary | ICD-10-CM

## 2023-12-29 LAB
DEPRECATED RDW RBC AUTO: 45.6 FL (ref 35.1–46.3)
ERYTHROCYTE [DISTWIDTH] IN BLOOD BY AUTOMATED COUNT: 13.4 % (ref 11–15)
GLUCOSE 1H P GLC SERPL-MCNC: 140 MG/DL
HCT VFR BLD AUTO: 32.4 %
HGB BLD-MCNC: 10.5 G/DL
MCH RBC QN AUTO: 30.3 PG (ref 26–34)
MCHC RBC AUTO-ENTMCNC: 32.4 G/DL (ref 31–37)
MCV RBC AUTO: 93.6 FL
PLATELET # BLD AUTO: 120 10(3)UL (ref 150–450)
RBC # BLD AUTO: 3.46 X10(6)UL
T PALLIDUM AB SER QL IA: NONREACTIVE
WBC # BLD AUTO: 11.7 X10(3) UL (ref 4–11)

## 2023-12-29 PROCEDURE — 36415 COLL VENOUS BLD VENIPUNCTURE: CPT

## 2023-12-29 PROCEDURE — 85027 COMPLETE CBC AUTOMATED: CPT

## 2023-12-29 PROCEDURE — 87389 HIV-1 AG W/HIV-1&-2 AB AG IA: CPT

## 2023-12-29 PROCEDURE — 82950 GLUCOSE TEST: CPT

## 2023-12-29 PROCEDURE — 86780 TREPONEMA PALLIDUM: CPT

## 2023-12-29 NOTE — TELEPHONE ENCOUNTER
----- Message from Wen Thapa CNM sent at 12/29/2023 10:47 AM CST -----  Abnormal glucose. Patient needs to schedule 3 hour - please call to inform.

## 2024-01-02 ENCOUNTER — TELEPHONE (OUTPATIENT)
Dept: OBGYN CLINIC | Facility: CLINIC | Age: 34
End: 2024-01-02

## 2024-01-02 NOTE — TELEPHONE ENCOUNTER
[facility]   Order stamped and faxed

## 2024-01-02 NOTE — TELEPHONE ENCOUNTER
Name and  verified    Patient given MBW results and recommendations. Patient given scheduling and fasting information. Verbalized understanding.

## 2024-01-03 ENCOUNTER — LABORATORY ENCOUNTER (OUTPATIENT)
Dept: LAB | Facility: HOSPITAL | Age: 34
End: 2024-01-03
Attending: ADVANCED PRACTICE MIDWIFE
Payer: COMMERCIAL

## 2024-01-03 DIAGNOSIS — R73.09 ELEVATED GLUCOSE TOLERANCE TEST: ICD-10-CM

## 2024-01-03 LAB
EST. AVERAGE GLUCOSE BLD GHB EST-MCNC: 100 MG/DL (ref 68–126)
GLUCOSE 1H P GLC SERPL-MCNC: 134 MG/DL
GLUCOSE 2H P GLC SERPL-MCNC: 114 MG/DL
GLUCOSE 3H P GLC SERPL-MCNC: 78 MG/DL (ref 70–140)
GLUCOSE P FAST SERPL-MCNC: 84 MG/DL
HBA1C MFR BLD: 5.1 % (ref ?–5.7)

## 2024-01-03 PROCEDURE — 82952 GTT-ADDED SAMPLES: CPT

## 2024-01-03 PROCEDURE — 82951 GLUCOSE TOLERANCE TEST (GTT): CPT

## 2024-01-03 PROCEDURE — 83036 HEMOGLOBIN GLYCOSYLATED A1C: CPT

## 2024-01-03 PROCEDURE — 36415 COLL VENOUS BLD VENIPUNCTURE: CPT

## 2024-01-08 PROBLEM — O99.810 ABNORMAL MATERNAL GLUCOSE TOLERANCE, ANTEPARTUM (HCC): Status: ACTIVE | Noted: 2024-01-08

## 2024-01-08 PROBLEM — O99.013 ANEMIA DURING PREGNANCY IN THIRD TRIMESTER (HCC): Status: ACTIVE | Noted: 2024-01-08

## 2024-01-08 PROBLEM — O99.810 ABNORMAL MATERNAL GLUCOSE TOLERANCE, ANTEPARTUM: Status: ACTIVE | Noted: 2024-01-08

## 2024-01-08 PROBLEM — O99.013 ANEMIA DURING PREGNANCY IN THIRD TRIMESTER: Status: ACTIVE | Noted: 2024-01-08

## 2024-01-09 ENCOUNTER — ROUTINE PRENATAL (OUTPATIENT)
Dept: OBGYN CLINIC | Facility: CLINIC | Age: 34
End: 2024-01-09

## 2024-01-09 VITALS
DIASTOLIC BLOOD PRESSURE: 74 MMHG | WEIGHT: 158 LBS | BODY MASS INDEX: 26 KG/M2 | SYSTOLIC BLOOD PRESSURE: 112 MMHG | HEART RATE: 99 BPM

## 2024-01-09 DIAGNOSIS — Z34.83 ENCOUNTER FOR SUPERVISION OF OTHER NORMAL PREGNANCY IN THIRD TRIMESTER: Primary | ICD-10-CM

## 2024-01-09 DIAGNOSIS — O99.013 ANEMIA DURING PREGNANCY IN THIRD TRIMESTER: ICD-10-CM

## 2024-01-09 PROCEDURE — 3074F SYST BP LT 130 MM HG: CPT | Performed by: ADVANCED PRACTICE MIDWIFE

## 2024-01-09 PROCEDURE — 3078F DIAST BP <80 MM HG: CPT | Performed by: ADVANCED PRACTICE MIDWIFE

## 2024-01-09 RX ORDER — FERROUS SULFATE 325(65) MG
325 TABLET ORAL EVERY OTHER DAY
Qty: 90 TABLET | Refills: 0 | Status: SHIPPED | OUTPATIENT
Start: 2024-01-09

## 2024-01-09 NOTE — PROGRESS NOTES
Eryn Penny is a 33 year old  pt at 30w1d here for SHONA  She is feeling well but some aches and pains.    ROS:  Denies cramping, bleeding, leaking of fluid.  Fetus is active.    Vitals:    24 0948   BP: 112/74   Pulse: 99   Weight: 158 lb (71.7 kg)      See flowsheet  TW lbs  S/p Tdap and Flu  Plts 120    Assessment/Plan:  IUP at 30w1d  Anemia  Gestational Thrombocytopenia    Orders Placed This Encounter   Procedures    CBC, Platelet; No Differential    Ferritin      Reviewed:  Recommendations and rationale for COVID and RSV vaccine(s) in pregnancy- Handout for RSV given   Labor precautions  Kick counts  Danger Signs/PreE s/s  Rx Iron every other day  CBC, Ferittin in 2 wks  MFM 32 wk growth US 24  RTC 2 wk(s)    Pt verbalized understanding.  All questions answered.  No barriers to learning identified

## 2024-01-22 ENCOUNTER — HOSPITAL ENCOUNTER (OUTPATIENT)
Dept: PERINATAL CARE | Facility: HOSPITAL | Age: 34
End: 2024-01-22
Attending: OBSTETRICS & GYNECOLOGY
Payer: COMMERCIAL

## 2024-01-22 ENCOUNTER — LAB ENCOUNTER (OUTPATIENT)
Dept: LAB | Facility: HOSPITAL | Age: 34
End: 2024-01-22
Attending: OBSTETRICS & GYNECOLOGY
Payer: COMMERCIAL

## 2024-01-22 ENCOUNTER — HOSPITAL ENCOUNTER (OUTPATIENT)
Dept: PERINATAL CARE | Facility: HOSPITAL | Age: 34
Discharge: HOME | End: 2024-01-22
Attending: OBSTETRICS & GYNECOLOGY
Payer: COMMERCIAL

## 2024-01-22 VITALS
HEART RATE: 92 BPM | BODY MASS INDEX: 26 KG/M2 | SYSTOLIC BLOOD PRESSURE: 95 MMHG | WEIGHT: 161 LBS | DIASTOLIC BLOOD PRESSURE: 62 MMHG

## 2024-01-22 DIAGNOSIS — D69.6 GESTATIONAL THROMBOCYTOPENIA, THIRD TRIMESTER (HCC): Primary | ICD-10-CM

## 2024-01-22 DIAGNOSIS — O99.013 ANEMIA DURING PREGNANCY IN THIRD TRIMESTER: ICD-10-CM

## 2024-01-22 DIAGNOSIS — O99.113 GESTATIONAL THROMBOCYTOPENIA, THIRD TRIMESTER (HCC): Primary | ICD-10-CM

## 2024-01-22 DIAGNOSIS — D69.6 THROMBOCYTOPENIA (HCC): ICD-10-CM

## 2024-01-22 LAB
DEPRECATED HBV CORE AB SER IA-ACNC: 8 NG/ML
DEPRECATED RDW RBC AUTO: 47.7 FL (ref 35.1–46.3)
ERYTHROCYTE [DISTWIDTH] IN BLOOD BY AUTOMATED COUNT: 14.6 % (ref 11–15)
HCT VFR BLD AUTO: 32.3 %
HGB BLD-MCNC: 10.5 G/DL
MCH RBC QN AUTO: 29.8 PG (ref 26–34)
MCHC RBC AUTO-ENTMCNC: 32.5 G/DL (ref 31–37)
MCV RBC AUTO: 91.8 FL
PLATELET # BLD AUTO: 138 10(3)UL (ref 150–450)
RBC # BLD AUTO: 3.52 X10(6)UL
WBC # BLD AUTO: 12 X10(3) UL (ref 4–11)

## 2024-01-22 PROCEDURE — 82728 ASSAY OF FERRITIN: CPT

## 2024-01-22 PROCEDURE — 76819 FETAL BIOPHYS PROFIL W/O NST: CPT

## 2024-01-22 PROCEDURE — 85027 COMPLETE CBC AUTOMATED: CPT

## 2024-01-22 PROCEDURE — 76816 OB US FOLLOW-UP PER FETUS: CPT | Performed by: OBSTETRICS & GYNECOLOGY

## 2024-01-22 PROCEDURE — 36415 COLL VENOUS BLD VENIPUNCTURE: CPT

## 2024-01-22 NOTE — PROGRESS NOTES
Outpatient Maternal-Fetal Medicine Consultation    Dear Ms. Smith    Thank you for requesting ultrasound evaluation and maternal fetal medicine consultation on your patient Eryn Penny.  As you are aware she is a 33 year old female  with a arango pregnancy and an Estimated Date of Delivery: 3/18/24.  A maternal-fetal medicine f/u is today.  Her prenatal records and labs were reviewed.    Feeling well today.  Getting cbc drawn today to check platelets again.  ROS    HISTORY  OB History    Para Term  AB Living   3 2 2 0 0 2   SAB IAB Ectopic Multiple Live Births   0 0 0 0 2      # Outcome Date GA Lbr Joe/2nd Weight Sex Delivery Anes PTL Lv   3 Current            2 Term 21 39w0d 06:59 / 00:12 7 lb 3.5 oz (3.275 kg) F NORMAL SPONT EPI N EDILMA   1 Term 19 39w2d 09:00 / 01:04 6 lb 8.8 oz (2.97 kg) F Vag-Spont EPI N EDILMA       Allergies:  No Known Allergies   Current Meds:  Current Outpatient Medications   Medication Sig Dispense Refill    Ferrous Sulfate 325 (65 Fe) MG Oral Tab Take 1 tablet (325 mg total) by mouth every other day. 90 tablet 0    prenatal vitamin with DHA 27-0.8-228 MG Oral Cap Take 1 capsule by mouth daily.          HISTORY:  Past Medical History:   Diagnosis Date    Precancerous skin lesion 2015      Past Surgical History:   Procedure Laterality Date    BIOPSY OF SKIN LESION      WISDOM TEETH REMOVED Bilateral       Family History   Problem Relation Age of Onset    Cancer Mother     Diabetes Maternal Grandfather       Social History     Socioeconomic History    Marital status:      Spouse name: Zhen Penny    Highest education level: Bachelor's degree (e.g., BA, AB, BS)   Occupational History    Occupation: [employer] Owner   Tobacco Use    Smoking status: Never    Smokeless tobacco: Never   Vaping Use    Vaping Use: Never used   Substance and Sexual Activity    Alcohol use: Not Currently    Drug use: Never    Sexual activity: Yes   Other  Topics Concern    Caffeine Concern No    Special Diet No    Exercise Yes    Seat Belt Yes     Social Determinants of Health     Financial Resource Strain: Low Risk  (9/3/2023)    Financial Resource Strain     Difficulty of Paying Living Expenses: Not hard at all     Med Affordability: No   Food Insecurity: No Food Insecurity (9/3/2023)    Food Insecurity     Food Insecurity: Never true   Transportation Needs: No Transportation Needs (9/3/2023)    Transportation Needs     Lack of Transportation: No   Stress: No Stress Concern Present (9/3/2023)    Stress     Feeling of Stress : No   Housing Stability: Low Risk  (9/3/2023)    Housing Stability     Housing Instability: No          PHYSICAL EXAMINATION:  BP 95/62   Pulse 92   Wt 161 lb (73 kg)   LMP 2023 (Exact Date)   BMI 25.99 kg/m²           Abdomen:  soft, nontender, no contractions          Neuro:  unremarkable        DISCUSSION  During her visit we discussed and reviewed the following issues:    WBC: 11.7, done on 2023.  HGB: 10.5, done on 2023.  PLT: 120, done on 2023.     Disorder 2021 and 3/27/2019  Platelet counts  23  133  2023 134  21  128  3/11/21 138  2020 166  non-pregnant  3/27/19    137  2009 186  non-pregnant    Incidental thrombocytopenia of pregnancy, also termed gestational thrombocytopenia, is defined by the following five criteria:  Mild and asymptomatic thrombocytopenia   No past history of thrombocytopenia (except possibly during a previous pregnancy)   Occurrence during late gestation   No association with fetal thrombocytopenia   Spontaneous resolution after delivery  Platelet counts are typically >70,000/microL, with about two-thirds being 130,000 to 150,000/microL. The frequency of gestational thrombocytopenia in the largest series of consecutive women admitted for labor and delivery is 5 percent.   thrombocytopenia essentially does not occur in infants born to mothers with  gestational thrombocytopenia, therefore, gestational thrombocytopenia is considered to be a benign condition and routine obstetrical care is encouraged.    Monitoring the maternal platelet count monthly is recommended to help identify patients with significant thrombocytopenia who may actually have a new diagnosis ITP.  ITP is more likely of the platelet count drops to 50,000 or less.  Weekly assessment of the platelet count is recommended after 35 weeks if the platelet count is <90,000.  Consideration of corticosteroids is recommended in these cases, particularly if regional anesthesia is going to be needed or desired.       Platelet counts are within the normal range of 150,000 to 450,000/microL in women during normal pregnancies.   Idiopathic thrombocytopenia(ITP) is the more likely diagnosis if thrombocytopenia occurs early during pregnancy or if the platelet count is very low (ie, <50,000/microL).     Thrombocytopenia occurring during pregnancy is a common diagnostic and management problem and may have many causes.   Asymptomatic thrombocytopenia is observed near term in about 5 percent of normal pregnancies, and thrombocytopenia, sometimes severe, develops in about 15 percent of women with preeclampsia.      Immune (idiopathic) thrombocytopenic purpura (ITP) is an acquired disorder. Only two criteria are required for its diagnosis:   Thrombocytopenia, with an otherwise normal complete blood count and white blood cell differential, including a normal peripheral blood smear AND  No clinically apparent associated condition(s) or medications that may cause thrombocytopenia are present.       There is no \"gold standard\" test that can establish the diagnosis of ITP. The diagnosis is, in part, one of exclusion, requiring that other causes of thrombocytopenia be ruled out. Few diagnostic studies other than the history, physical examination, complete blood count, and examination of the blood smear are necessary.            Major bleeding is rare in patients with ITP, primarily occurring in those with platelet counts below 10,000/microL. The goal for treatment of ITP is to provide a safe platelet count to prevent major bleeding, rather than returning the platelet count to normal        Epidural anesthesia is considered to be safe in women with  thrombocytopenia who have platelet counts above 80,000/microL.  The possibility of preeclampsia should be considered in pregnant women.   Cesarian delivery should be reserved for standard obstetrical indications only.  50,000 platelets are needed for .  20,000 platelets are needed for vaginal delivery.  Currently on 720,000, she is not at risk for spontaneous bleeding.  We discussed that this is usually not a concern until people are less than 20,000 platelets.  Reassured for her platelet level of 120,000.                      Recommendations:     1.  check platlets q 2 months and at 35-36wks.  She is not at risk unless the platlet count is <50k and intracranial hemorrhage of the fetus with maternal ITP is not typically a risk until the platlet count is <20k.        2.  level 2 US at 18-20wks         3.  growth US in the 3rd trimester          GLUCOSE 1HR OB   Date Value Ref Range Status   2023 140 (H) See comment mg/dL Final     Comment:     Lab Results   Component Value Date    GLUFG 84 2024    GLU1G 134 2024    GLU2G 114 2024    GLU3G 78 2024        OB ULTRASOUND REPORT   See imaging tab for complete ultrasound report or in PACS  Ultrasound Findings:  Single IUP in cephalic presentation.    Placenta is posterior, right.   A 3 vessel cord is noted.  Cardiac activity is present at 136 bpm  EFW 1833 g ( 4 lb 1 oz); 43%.    LEA is  8.4 cm.  MVP is 4 cm  BPP is 8/8.     The fetal measurements are consistent with established EDC. No gross ultrasound evidence of structural abnormalities are seen today. The patient understands that ultrasound cannot rule out  all structural and chromosomal abnormalities.     IMPRESSION:   1. IUP @  32w0d   2. Scan consistent with dates  3. No fetal structural abnormalities seen  4. Thrombocytopenia -likely gestational with lowering platelets to 120,000    RECOMMENDATIONS:  Continue care with Ms/ [place]  check platlets q 2 months and at 35-36wks. Weekly after 35 weeks if less than 90,000.  F/u today's CBC          Thank you for allowing me to participate in the care of your patient.  Please do not hesitate to contact me if additional questions or concerns arise.      Jolynn Toney MD   Maternal Fetal Medicine         Note to patient and family  The 21st Century Cures Act makes medical notes available to patients in the interest of transparency.  However, please be advised that this is a medical document.  It is intended as [id number] communication.  It is written and medical language may contain abbreviations or verbiage that are technical and unfamiliar.  It may appear blunt or direct.  Medical documents are intended to carry relevant information, facts as evident, and the clinical opinion of the practitioner.

## 2024-01-24 ENCOUNTER — ROUTINE PRENATAL (OUTPATIENT)
Dept: OBGYN CLINIC | Facility: CLINIC | Age: 34
End: 2024-01-24
Payer: COMMERCIAL

## 2024-01-24 VITALS
HEART RATE: 87 BPM | SYSTOLIC BLOOD PRESSURE: 105 MMHG | WEIGHT: 159 LBS | BODY MASS INDEX: 26 KG/M2 | DIASTOLIC BLOOD PRESSURE: 72 MMHG

## 2024-01-24 DIAGNOSIS — Z34.80 SUPERVISION OF OTHER NORMAL PREGNANCY, ANTEPARTUM: Primary | ICD-10-CM

## 2024-01-24 PROBLEM — O99.810 ABNORMAL MATERNAL GLUCOSE TOLERANCE, ANTEPARTUM: Status: RESOLVED | Noted: 2024-01-08 | Resolved: 2024-01-24

## 2024-01-24 PROBLEM — O99.810 ABNORMAL MATERNAL GLUCOSE TOLERANCE, ANTEPARTUM (HCC): Status: RESOLVED | Noted: 2024-01-08 | Resolved: 2024-01-24

## 2024-01-24 PROBLEM — Z28.39 MATERNAL VARICELLA, NON-IMMUNE (HCC): Status: RESOLVED | Noted: 2023-09-05 | Resolved: 2024-01-24

## 2024-01-24 PROBLEM — O09.899 MATERNAL VARICELLA, NON-IMMUNE (HCC): Status: RESOLVED | Noted: 2023-09-05 | Resolved: 2024-01-24

## 2024-01-24 PROBLEM — O09.899 MATERNAL VARICELLA, NON-IMMUNE: Status: RESOLVED | Noted: 2023-09-05 | Resolved: 2024-01-24

## 2024-01-24 PROBLEM — Z28.39 MATERNAL VARICELLA, NON-IMMUNE: Status: RESOLVED | Noted: 2023-09-05 | Resolved: 2024-01-24

## 2024-01-24 PROCEDURE — 3078F DIAST BP <80 MM HG: CPT | Performed by: ADVANCED PRACTICE MIDWIFE

## 2024-01-24 PROCEDURE — 3074F SYST BP LT 130 MM HG: CPT | Performed by: ADVANCED PRACTICE MIDWIFE

## 2024-01-24 NOTE — PROGRESS NOTES
Active fetus Denies any complaints.  Denies any vaginal bleeding, leaking of fluid or vaginal discharge.   No signs signs of PTL.  Reviewed S&S of PTL  Warning signs reviewed  All questions answered.   CBE: [employer]  services

## 2024-02-08 ENCOUNTER — ROUTINE PRENATAL (OUTPATIENT)
Dept: OBGYN CLINIC | Facility: CLINIC | Age: 34
End: 2024-02-08
Payer: COMMERCIAL

## 2024-02-08 VITALS
DIASTOLIC BLOOD PRESSURE: 69 MMHG | WEIGHT: 161 LBS | BODY MASS INDEX: 26 KG/M2 | HEART RATE: 92 BPM | SYSTOLIC BLOOD PRESSURE: 106 MMHG

## 2024-02-08 DIAGNOSIS — Z34.83 ENCOUNTER FOR SUPERVISION OF OTHER NORMAL PREGNANCY IN THIRD TRIMESTER: Primary | ICD-10-CM

## 2024-02-08 DIAGNOSIS — N76.0 VAGINITIS AND VULVOVAGINITIS: ICD-10-CM

## 2024-02-08 NOTE — PROGRESS NOTES
Eryn is a 33 year old , at 34w3d, here for her return OB visit.  Currently, she is feeling well. Denies contractions, bleeding, and leakage of fluid. Endorses active fetus. Reports a large amount of yellow discharge in the past 3 days with some odor. Had BV earlier in the pregnancy, thinks she may have it again. Desires oral antibiotics for treatment this time if she tests positive for BV.    Vital signs and weight reviewed  See flowsheets     Today's Assessment/Plan:   Vaginal culture collected and sent. Will treat with oral metronidazole if positive per pt preference.  Weekly CBC ordered to follow platelets. Pt instructed to complete before her next visit in 2 weeks.  Discussed plan to do GBS swab next visit.    Next visit: Follow up OB: 2 weeks    Reviewed:   3rd trimester precautions and expectations   labor precautions  Kick counts  Danger signs  Prenatal visit schedule      Pt verbalized understanding. All questions answered. No barriers to learning identified

## 2024-02-08 NOTE — PATIENT INSTRUCTIONS
Understanding  Labor  Going into labor before your 37th week of pregnancy is called  labor.  labor can cause your baby to be born too soon. This can lead to a number of health problems that may affect your baby.      Before labor, the cervix is thick and closed.      In  labor, the cervix begins to efface (thin) and dilate (open).   Symptoms of  labor   If you think you’re having  labor, get medical help right away. Contractions alone don’t mean you’re in  labor. What matters more are changes in your cervix (the lower end of the uterus). Symptoms of  labor include:   Four or more contractions per hour  Strong contractions  Constant menstrual-like cramping  Low-back pain  Mucous or bloody vaginal discharge  Bleeding or spotting in the second or third trimester  Evaluating  labor   Your healthcare provider will try to find out whether you’re in  labor or whether you’re just having contractions. He or she may watch you for a few hours. The following tests may be done:   Pelvic exam to see if your cervix has effaced (thinned) and dilated (opened)  Uterine activity monitoring to detect contractions  Fetal monitoring to check the health of your baby  Ultrasound to check your baby’s size and position  Amniocentesis to check how mature your baby’s lungs are  Caring for yourself at home   If you have  contractions, but your cervix is still thick and closed, your healthcare provider may ask you to do the following at home:   Drink plenty of water.  Do fewer activities.  Rest in bed on your side.  Don't have intercourse or nipple stimulation.  When to call your healthcare provider   Call your healthcare provider if you notice any of these:   Four or more contractions per hour  Bag of water breaks  Bleeding or spotting  If you need hospital care    labor often requires that you have hospital care and complete bed rest. You may have an IV  (intravenous) line to get fluids. You may be given pills or injections to help prevent contractions. Finally, you may get medicine (corticosteroids) that helps your baby’s lungs mature more quickly.   Are you at risk?   Any pregnant woman can have  labor. It may start for no reason. But these risk factors can increase your chances:   Past  labor or past early birth  Smoking, drug, or alcohol use during pregnancy  Multiple fetuses (twins or more)  Problems with the shape of the uterus  Bleeding during the pregnancy  The dangers of  birth   A baby born too soon may have health problems. This is because the baby didn’t have enough time to mature. Some of the risks for your baby include:   Not breastfeeding or feeding well  Having immature lungs  Bleeding in the brain  Dying  Reaching term   Your goal is to get as close to term as you can before giving birth. The closer you get to term, the higher your chance of having a healthy baby. Work with your healthcare provider. Together, you can take steps that may keep you from giving birth too early.   [employer] last reviewed this educational content on 3/1/2019  © 1895-8128 [employer]. [address] [place] [address]. All rights reserved. This information is not intended as a substitute for professional medical care. Always follow your healthcare professional's instructions.        Premature Labor    Premature labor ( labor) is when symptoms of labor occur before 37 weeks of pregnancy. (This is 3 weeks before your due date.) Premature labor can lead to premature delivery. This means giving birth to your baby early. Babies need at least 37 weeks of pregnancy for all the organs to develop normally. The earlier the delivery, the greater the risks to the baby.  In most cases, the cause of premature labor is unknown. But certain factors may make the problem more likely. These include:  History of premature labor with other  pregnancies  Smoking  Alcohol or substance abuse  Low pre-pregnancy weight or weight gain during pregnancy  Short time period between pregnancies  Being pregnant with twins, triplets, or more  History of certain types of surgery on the cervix or uterus  Having a short cervix  Certain infections  There are a number of other risk factors. Ask your healthcare provider to help you understand the risk factors specific to your case. Then find out what you can do to control or reduce them.  Contractions are one of the main signs of premature labor. A contraction is different from cramping. It may feel painful and the belly (abdomen) may get hard. It can last from a few seconds to a few minutes. Some women may feel only a sense of pressure in the belly, thighs, rectum, or vagina. Some may feel only the hardening of the uterus without pain or pressure. Or there may be a constant pain in the lower back, which spreads forward toward the belly.Premature labor is often treated with medicines. A hospital stay may be needed. If labor doesn't progress and you and your baby are both healthy, you may be discharged to continue care at home.  Home care  Ask your provider any questions you have. Be certain you understand how to care for yourself at home. Also follow all recommendations given by your healthcare providers.  Learn the signs of premature labor. Watch for these signs when you get home.  Limit or restrict activities as advised. This may include stopping certain physical activities and cutting back hours at work.  Avoid doing strenuous work as directed by your provider. Ask family and friends for help with tasks and support at home, if needed.  Don’t smoke, drink alcohol, or use other harmful substances.  Take steps to reduce stress.  Report any unusual symptoms to your provider.    Follow-up care  Follow up with your healthcare provider, or as directed. Weekly visits with your provider may be needed.  When to seek medical  advice  Call your healthcare provider right away if any of these occur:  Regular or frequent contractions, whether they are painful or not  Pressure in the pelvis  Pressure in the lower belly or mild cramping in your belly with or without diarrhea  Constant low, dull backache  Gush or slow leaking of water from your vagina  Change in vaginal discharge (watery, mucus, or bloody)  Any vaginal bleeding  Decreased movement of your baby  [employer] last reviewed this educational content on 6/1/2018 © 2000-2020 [employer]. [address] [place] [address]. All rights reserved. This information is not intended as a substitute for professional medical care. Always follow your healthcare professional's instructions.        Understanding Preeclampsia  Preeclampsia is high blood pressure (hypertension) that happens during pregnancy. It often shows up around the 20th week of pregnancy. It often goes back to normal by the 12th week after you give birth. It can lead to serious health risks for you and your baby. During your pregnancy, your healthcare provider will watch your blood pressure.    Symptoms  A common symptom of preeclampsia is high blood pressure. Other symptoms may include:  Rapid weight gain  Protein in your urine  Headache  Belly (abdominal) pain on your right side  Vision problems. These include flashes or spots.  Swelling (edema) in your face or hands. This also often happens near the end of normal pregnancies, even without preeclampsia.  Tests you may have  Your healthcare provider will want to check your blood pressure throughout your pregnancy. If your blood pressure is high, you may have the following tests:  Urine tests to look for protein  Blood tests to confirm preeclampsia  Fetal monitoring to make sure that your baby is healthy  Treating preeclampsia  You may need to take a daily low dose of aspirin if you are at risk for preeclampsia. Preeclampsia almost always ends soon after you  give birth. Until then, your healthcare provider can help manage your condition. If your symptoms are mild, you may need activity limits at home, including bed rest and no heavy lifting. If your symptoms are severe, you will stay in the hospital. Hospital treatment includes:  Activity limits to help control blood pressure. This means no heavy lifting and 8 hours per day lying down with the feet up.  Magnesium IV (intravenous) drip during labor to prevent seizures  Induced labor or surgical delivery by  section. Delivery is considered the cure for preeclampsia.  When to call your healthcare provider  Call your healthcare provider if swelling, weight gain, or other symptoms come on quickly or are severe. Some cases of preeclampsia are more severe than others. Your symptoms also may change or get worse as you get closer to your due date.  Who’s at risk?  No one knows what causes preeclampsia. Preeclampsia can happen in any pregnant woman. But it is more common in first-time pregnancies. Things that increase the risk include:  Previous pregnancies. You are at risk if you had preeclampsia, intrauterine growth retardation (IUGR),  birth, placental abruption, or fetal death in a past pregnancy.  Health history of mother. You are at risk if you have diabetes, high blood pressure, obesity, kidney disease, autoimmune disease such as lupus, or a family history of preeclampsia.  Current pregnancy. You are at risk if this is your first pregnancy, or if you have multiple fetuses, are younger than age 18 or older than 40, or used in vitro fertilization.  Race. You are at risk if you are black.  Dangers of preeclampsia  If not treated, preeclampsia can cause problems for you and your baby. The placenta is the organ that nourishes your baby. It may tear away from the uterine wall. This can put the baby at risk for health problems (fetal distress) and premature birth. Preeclampsia can also cause these health  problems:  Kidney failure or other organ damage  Seizures  Stroke  Once you give birth  In most cases, preeclampsia goes away on its own soon after you give birth. This is often by the 12th week after you give deliver. Within days of delivery, your blood pressure, swelling, and other symptoms should get better. For some women, problems from preeclampsia can continue after delivery.  Postpartum preeclampsia that develops within the first 48 hours after delivery is rare. Another type of postpartum preeclampsia that develops more than 48 hours after delivery is called late-onset preeclampsia. It is also rare. Contact your healthcare provider right away if you have symptoms of preeclampsia after you deliver.  [employer] last reviewed this educational content on 12/1/2019 © 2000-2020 [employer]. [address] [place] [address], [place] [address]. All rights reserved. This information is not intended as a substitute for professional medical care. Always follow your healthcare professional's instructions.        Kick Counts    It’s normal to worry about your baby’s health. One way you can know your baby’s doing well is to record the baby’s movements once a day. This is called a kick count. Remember to take your kick count records to all your appointments with your healthcare provider.  How to count kicks  Time how long it takes you to feel 10 kicks, flutters, swishes, or rolls. Ideally, you want to feel at least 10 movements within 2 hours. You will likely feel 10 movements in less time than that.  Starting at 28 weeks, count your baby's movements daily. Follow your healthcare provider's instructions for kick counting. Here are tips for counting kicks:  Choose a time when the baby is active, such as after a meal.   Sit comfortably or lie on your side.   The first time the baby moves, write down the time.   Count each movement until the baby has moved 10 times. This can take from 20 minutes to 2 hours.   If you have  not felt 10 kicks by the end of the second hour, wait a few hours. Then try again.  Try to do it at the same time each day.  When to call your healthcare provider  Call your healthcare provider right away if:  You do a couple sets of kick counts during the day and your baby moves fewer than 10 times in 2 hours  Your baby moves much less often than on the days before.  You have not felt your baby move all day.  [employer] last reviewed this educational content on 12/1/2017 © 2000-2020 The [employer]. [address] [place] [address]. All rights reserved. This information is not intended as a substitute for professional medical care. Always follow your healthcare professional's instructions.

## 2024-02-09 LAB
BV BACTERIA DNA VAG QL NAA+PROBE: NEGATIVE
C GLABRATA DNA VAG QL NAA+PROBE: NEGATIVE
C KRUSEI DNA VAG QL NAA+PROBE: NEGATIVE
CANDIDA DNA VAG QL NAA+PROBE: POSITIVE
T VAGINALIS DNA VAG QL NAA+PROBE: NEGATIVE

## 2024-02-19 ENCOUNTER — LAB ENCOUNTER (OUTPATIENT)
Dept: LAB | Facility: HOSPITAL | Age: 34
End: 2024-02-19
Attending: ADVANCED PRACTICE MIDWIFE
Payer: COMMERCIAL

## 2024-02-19 ENCOUNTER — ROUTINE PRENATAL (OUTPATIENT)
Dept: OBGYN CLINIC | Facility: CLINIC | Age: 34
End: 2024-02-19
Payer: COMMERCIAL

## 2024-02-19 VITALS
BODY MASS INDEX: 26 KG/M2 | DIASTOLIC BLOOD PRESSURE: 66 MMHG | HEART RATE: 93 BPM | WEIGHT: 164 LBS | SYSTOLIC BLOOD PRESSURE: 100 MMHG

## 2024-02-19 DIAGNOSIS — Z34.83 ENCOUNTER FOR SUPERVISION OF OTHER NORMAL PREGNANCY IN THIRD TRIMESTER: Primary | ICD-10-CM

## 2024-02-19 DIAGNOSIS — B37.31 YEAST INFECTION OF THE VAGINA: ICD-10-CM

## 2024-02-19 DIAGNOSIS — Z34.83 ENCOUNTER FOR SUPERVISION OF OTHER NORMAL PREGNANCY IN THIRD TRIMESTER (HCC): ICD-10-CM

## 2024-02-19 PROBLEM — Z34.90 PREGNANT (HCC): Status: ACTIVE | Noted: 2024-02-19

## 2024-02-19 LAB
DEPRECATED RDW RBC AUTO: 49.8 FL (ref 35.1–46.3)
ERYTHROCYTE [DISTWIDTH] IN BLOOD BY AUTOMATED COUNT: 14.9 % (ref 11–15)
HCT VFR BLD AUTO: 35.2 %
HGB BLD-MCNC: 11.8 G/DL
MCH RBC QN AUTO: 30.9 PG (ref 26–34)
MCHC RBC AUTO-ENTMCNC: 33.5 G/DL (ref 31–37)
MCV RBC AUTO: 92.1 FL
PLATELET # BLD AUTO: 146 10(3)UL (ref 150–450)
RBC # BLD AUTO: 3.82 X10(6)UL
WBC # BLD AUTO: 12.9 X10(3) UL (ref 4–11)

## 2024-02-19 PROCEDURE — 36415 COLL VENOUS BLD VENIPUNCTURE: CPT

## 2024-02-19 PROCEDURE — 85027 COMPLETE CBC AUTOMATED: CPT

## 2024-02-19 NOTE — PROGRESS NOTES
Patient presents for routine OB visit. Reports positive fetal movement. Denies LOF, VB and contractions. Reports yellow/white discharge. Positive yeast culture 2/8.    Patient has thrombocytopenia. Had CBC drawn today. Results pending.    GBS completed today     Yeast visualized at introitus- prescription sent to pharmacy. Education about medication administration.    Reviewed warning signs    Birth plan reviewed:    Prior birth experiences/outcome: Vaginal delivery x2  Support:  and   Pain management: unmedicated  Vitamin K: yes  Erythromycin ointment: yes  Placenta: encapsulated. Reviewed GBS policy with patient. Verbalizes understanding  Circumcision: n/a, girl  Peds: Doctor at [facility]. Needs to look up name.  Housing/ Car seat: has car seat  Contraception: vasectomy    RTC 1 week    Assessment and Plan completed by OLYA Pascal under the direct supervision of Wen Thapa CNM

## 2024-02-19 NOTE — PROGRESS NOTES
Patient seen in conjunction with SAPN under my direct supervision. I was present for history, physical, assessment and plan. I agree with documentation per sAPN.

## 2024-02-20 LAB — GROUP B STREP BY PCR FOR PCR OVT: NEGATIVE

## 2024-02-27 ENCOUNTER — LAB ENCOUNTER (OUTPATIENT)
Dept: LAB | Facility: HOSPITAL | Age: 34
End: 2024-02-27
Attending: ADVANCED PRACTICE MIDWIFE
Payer: COMMERCIAL

## 2024-02-27 ENCOUNTER — ROUTINE PRENATAL (OUTPATIENT)
Dept: OBGYN CLINIC | Facility: CLINIC | Age: 34
End: 2024-02-27
Payer: COMMERCIAL

## 2024-02-27 VITALS
BODY MASS INDEX: 26 KG/M2 | WEIGHT: 164 LBS | SYSTOLIC BLOOD PRESSURE: 100 MMHG | DIASTOLIC BLOOD PRESSURE: 68 MMHG | HEART RATE: 80 BPM

## 2024-02-27 DIAGNOSIS — Z34.83 ENCOUNTER FOR SUPERVISION OF OTHER NORMAL PREGNANCY IN THIRD TRIMESTER (HCC): ICD-10-CM

## 2024-02-27 DIAGNOSIS — Z3A.37: Primary | ICD-10-CM

## 2024-02-27 LAB
DEPRECATED RDW RBC AUTO: 48.1 FL (ref 35.1–46.3)
ERYTHROCYTE [DISTWIDTH] IN BLOOD BY AUTOMATED COUNT: 14.6 % (ref 11–15)
HCT VFR BLD AUTO: 36.3 %
HGB BLD-MCNC: 12 G/DL
MCH RBC QN AUTO: 29.7 PG (ref 26–34)
MCHC RBC AUTO-ENTMCNC: 33.1 G/DL (ref 31–37)
MCV RBC AUTO: 89.9 FL
PLATELET # BLD AUTO: 132 10(3)UL (ref 150–450)
RBC # BLD AUTO: 4.04 X10(6)UL
WBC # BLD AUTO: 11.4 X10(3) UL (ref 4–11)

## 2024-02-27 PROCEDURE — 85027 COMPLETE CBC AUTOMATED: CPT

## 2024-02-27 PROCEDURE — 36415 COLL VENOUS BLD VENIPUNCTURE: CPT

## 2024-02-27 NOTE — PROGRESS NOTES
Active fetus Increasing BH contractions. Denies any leaking of fluid or bleeding.  Reviewed S&S labor  Warning signs reviewed  All questions answered.

## 2024-03-06 ENCOUNTER — ROUTINE PRENATAL (OUTPATIENT)
Dept: OBGYN CLINIC | Facility: CLINIC | Age: 34
End: 2024-03-06

## 2024-03-06 ENCOUNTER — LAB ENCOUNTER (OUTPATIENT)
Dept: LAB | Facility: HOSPITAL | Age: 34
End: 2024-03-06
Attending: ADVANCED PRACTICE MIDWIFE
Payer: COMMERCIAL

## 2024-03-06 VITALS
WEIGHT: 165 LBS | BODY MASS INDEX: 27 KG/M2 | SYSTOLIC BLOOD PRESSURE: 105 MMHG | DIASTOLIC BLOOD PRESSURE: 69 MMHG | HEART RATE: 78 BPM

## 2024-03-06 DIAGNOSIS — Z34.83 PRENATAL CARE, SUBSEQUENT PREGNANCY IN THIRD TRIMESTER (HCC): Primary | ICD-10-CM

## 2024-03-06 DIAGNOSIS — D69.6 THROMBOCYTOPENIA (HCC): ICD-10-CM

## 2024-03-06 DIAGNOSIS — Z34.83 ENCOUNTER FOR SUPERVISION OF OTHER NORMAL PREGNANCY IN THIRD TRIMESTER (HCC): ICD-10-CM

## 2024-03-06 PROBLEM — O99.013 ANEMIA DURING PREGNANCY IN THIRD TRIMESTER (HCC): Status: RESOLVED | Noted: 2024-01-08 | Resolved: 2024-03-06

## 2024-03-06 LAB
DEPRECATED RDW RBC AUTO: 49.5 FL (ref 35.1–46.3)
ERYTHROCYTE [DISTWIDTH] IN BLOOD BY AUTOMATED COUNT: 14.9 % (ref 11–15)
HCT VFR BLD AUTO: 37 %
HGB BLD-MCNC: 12 G/DL
MCH RBC QN AUTO: 29.6 PG (ref 26–34)
MCHC RBC AUTO-ENTMCNC: 32.4 G/DL (ref 31–37)
MCV RBC AUTO: 91.4 FL
PLATELET # BLD AUTO: 151 10(3)UL (ref 150–450)
RBC # BLD AUTO: 4.05 X10(6)UL
WBC # BLD AUTO: 10.9 X10(3) UL (ref 4–11)

## 2024-03-06 PROCEDURE — 36415 COLL VENOUS BLD VENIPUNCTURE: CPT

## 2024-03-06 PROCEDURE — 85027 COMPLETE CBC AUTOMATED: CPT

## 2024-03-06 NOTE — PROGRESS NOTES
Eryn, , is at 38w2d, here for her [facility] visit.  Currently, she is feeling well. Denies 3rd trimester danger signs. Lost her mucus plug. Wants SVE.    Vital signs and weight reviewed  See flowsheets    Assessment/Plan: CBC for platelet check in process this AM  Next visit: 1 week    Reviewed:   Prenatal visit schedule  Kick counts  Danger signs  Labor precautions    Pt verbalized understanding. All questions answered. No barriers to learning identified

## 2024-03-06 NOTE — PATIENT INSTRUCTIONS
Understanding Preeclampsia  Preeclampsia is high blood pressure (hypertension) that happens during pregnancy. It often shows up around the 20th week of pregnancy. It often goes back to normal by the 12th week after you give birth. It can lead to serious health risks for you and your baby. During your pregnancy, your healthcare provider will watch your blood pressure.    Symptoms  A common symptom of preeclampsia is high blood pressure. Other symptoms may include:  Rapid weight gain  Protein in your urine  Headache  Belly (abdominal) pain on your right side  Vision problems. These include flashes or spots.  Swelling (edema) in your face or hands. This also often happens near the end of normal pregnancies, even without preeclampsia.  Tests you may have  Your healthcare provider will want to check your blood pressure throughout your pregnancy. If your blood pressure is high, you may have the following tests:  Urine tests to look for protein  Blood tests to confirm preeclampsia  Fetal monitoring to make sure that your baby is healthy  Treating preeclampsia  You may need to take a daily low dose of aspirin if you are at risk for preeclampsia. Preeclampsia almost always ends soon after you give birth. Until then, your healthcare provider can help manage your condition. If your symptoms are mild, you may need activity limits at home, including bed rest and no heavy lifting. If your symptoms are severe, you will stay in the hospital. Hospital treatment includes:  Activity limits to help control blood pressure. This means no heavy lifting and 8 hours per day lying down with the feet up.  Magnesium IV (intravenous) drip during labor to prevent seizures  Induced labor or surgical delivery by  section. Delivery is considered the cure for preeclampsia.  When to call your healthcare provider  Call your healthcare provider if swelling, weight gain, or other symptoms come on quickly or are severe. Some cases of  preeclampsia are more severe than others. Your symptoms also may change or get worse as you get closer to your due date.  Who’s at risk?  No one knows what causes preeclampsia. Preeclampsia can happen in any pregnant woman. But it is more common in first-time pregnancies. Things that increase the risk include:  Previous pregnancies. You are at risk if you had preeclampsia, intrauterine growth retardation (IUGR),  birth, placental abruption, or fetal death in a past pregnancy.  Health history of mother. You are at risk if you have diabetes, high blood pressure, obesity, kidney disease, autoimmune disease such as lupus, or a family history of preeclampsia.  Current pregnancy. You are at risk if this is your first pregnancy, or if you have multiple fetuses, are younger than age 18 or older than 40, or used in vitro fertilization.  Race. You are at risk if you are black.  Dangers of preeclampsia  If not treated, preeclampsia can cause problems for you and your baby. The placenta is the organ that nourishes your baby. It may tear away from the uterine wall. This can put the baby at risk for health problems (fetal distress) and premature birth. Preeclampsia can also cause these health problems:  Kidney failure or other organ damage  Seizures  Stroke  Once you give birth  In most cases, preeclampsia goes away on its own soon after you give birth. This is often by the 12th week after you give deliver. Within days of delivery, your blood pressure, swelling, and other symptoms should get better. For some women, problems from preeclampsia can continue after delivery.  Postpartum preeclampsia that develops within the first 48 hours after delivery is rare. Another type of postpartum preeclampsia that develops more than 48 hours after delivery is called late-onset preeclampsia. It is also rare. Contact your healthcare provider right away if you have symptoms of preeclampsia after you deliver.  Leigh last reviewed this  educational content on 12/1/2019 © 2000-2020 [employer]. [address] [place] [address]. All rights reserved. This information is not intended as a substitute for professional medical care. Always follow your healthcare professional's instructions.        Kick Counts    It’s normal to worry about your baby’s health. One way you can know your baby’s doing well is to record the baby’s movements once a day. This is called a kick count. Remember to take your kick count records to all your appointments with your healthcare provider.  How to count kicks  Time how long it takes you to feel 10 kicks, flutters, swishes, or rolls. Ideally, you want to feel at least 10 movements within 2 hours. You will likely feel 10 movements in less time than that.  Starting at 28 weeks, count your baby's movements daily. Follow your healthcare provider's instructions for kick counting. Here are tips for counting kicks:  Choose a time when the baby is active, such as after a meal.   Sit comfortably or lie on your side.   The first time the baby moves, write down the time.   Count each movement until the baby has moved 10 times. This can take from 20 minutes to 2 hours.   If you have not felt 10 kicks by the end of the second hour, wait a few hours. Then try again.  Try to do it at the same time each day.  When to call your healthcare provider  Call your healthcare provider right away if:  You do a couple sets of kick counts during the day and your baby moves fewer than 10 times in 2 hours  Your baby moves much less often than on the days before.  You have not felt your baby move all day.  [employer] last reviewed this educational content on 12/1/2017 © 2000-2020 [employer]. [address] [place] [id number]. All rights reserved. This information is not intended as a substitute for professional medical care. Always follow your healthcare professional's instructions.        Recognizing  Labor    The beginning of labor is the beginning of birth. You’ll start to feel strong contractions. That’s when the muscles of your uterus tighten up to help push your baby out during birth.  Yes, labor has probably started   Signs of labor include:  Your contractions are getting stronger and more painful instead of weaker. You’ll probably feel them throughout your whole uterus.  Your contractions are regular. This means that you feel them about every 5 to 10 minutes. And they are getting closer together.  You have pink-colored or blood-streaked fluid from your vagina.  You feel that the baby has \"dropped\" lower in your pelvis   Your water breaks. It may be a gush or a slow trickle of clear fluid from your vagina.  No, it’s probably not real labor   Signs of false labor include:  Your contractions aren’t regular or strong.  You feel the contractions only in your lower uterus.  Your contractions go away when you walk or change position.  Your contractions go away after drinking fluids.  When to call your healthcare provider  Call your healthcare provider or clinic right away if you notice any of these signs:  Fluid from your vagina, with or without contractions.  Bleeding heavy enough that you need a sanitary pad.  You don’t feel your baby moving as much as before.     NOTE: Contractions are timed by both of these measures:  The length of each contraction from its start to its finish.  How far apart the contractions are--the time between the start of one contraction and the start of the next contraction.   [employer] last reviewed this educational content on 10/1/2017  © 0931-2401 [employer]. [address] [place] [address], [place] [address]. All rights reserved. This information is not intended as a substitute for professional medical care. Always follow your healthcare professional's instructions.        Stages of Labor    Labor has 3 stages. Your healthcare provider may talk about the progress of your labor  with certain words. One of these is your baby’s position. Another is your baby’s station. And the effacement and dilation of your cervix will be noted. Read below to learn about these terms and the 3 stages of labor.  Your baby moves into position  Position is your baby’s placement in your uterus. Your baby may be facing left or right. He or she may be head first or feet first. Station refers to how far your baby has moved down into your pelvic cavity.  First stage of labor  During the first stage of labor, contractions of the uterus help your cervix thin (efface). They also help it widen (dilate). This will help your baby pass through the birth canal (vagina). At first your contractions will not come that often or last that long. But as time passes, they will come more often, they may be more painful, and they will last longer. They will last about 30 to 60 seconds each. The first stage of labor lasts until the cervix is fully dilated.  Second stage of labor  When your cervix is fully dilated, the second stage of labor begins. In this stage, you will have stronger contractions of your uterus that will help your baby move down the birth canal. They may happen every 2 to 5 minutes. They may last from 45 to 90 seconds each. Your healthcare provider will ask you to push with each contraction. Try to rest between the contractions if you can. Your baby is delivered at the end of this stage of labor.  Third stage of labor  The third stage of labor comes after your baby is born. This is when the afterbirth (placenta) comes out of your uterus. Your uterus will continue to contract. But the contractions are much milder than before.  [employer] last reviewed this educational content on 10/1/2017  © 7611-8026 [employer]. [address] [place] [address], [place] [address]. All rights reserved. This information is not intended as a substitute for professional medical care. Always follow your healthcare professional's  instructions.

## 2024-03-13 ENCOUNTER — HOSPITAL ENCOUNTER (OUTPATIENT)
Facility: HOSPITAL | Age: 34
Discharge: HOME | End: 2024-03-13
Attending: ADVANCED PRACTICE MIDWIFE | Admitting: OBSTETRICS & GYNECOLOGY
Payer: COMMERCIAL

## 2024-03-13 ENCOUNTER — ROUTINE PRENATAL (OUTPATIENT)
Dept: OBGYN CLINIC | Facility: CLINIC | Age: 34
End: 2024-03-13

## 2024-03-13 VITALS
HEART RATE: 94 BPM | DIASTOLIC BLOOD PRESSURE: 74 MMHG | WEIGHT: 167 LBS | BODY MASS INDEX: 27 KG/M2 | SYSTOLIC BLOOD PRESSURE: 111 MMHG | RESPIRATION RATE: 16 BRPM | TEMPERATURE: 98 F

## 2024-03-13 VITALS
DIASTOLIC BLOOD PRESSURE: 65 MMHG | WEIGHT: 167 LBS | SYSTOLIC BLOOD PRESSURE: 99 MMHG | HEART RATE: 106 BPM | BODY MASS INDEX: 27 KG/M2

## 2024-03-13 DIAGNOSIS — Z34.83 PRENATAL CARE, SUBSEQUENT PREGNANCY IN THIRD TRIMESTER (HCC): Primary | ICD-10-CM

## 2024-03-13 PROCEDURE — 59025 FETAL NON-STRESS TEST: CPT

## 2024-03-13 PROCEDURE — 59426 ANTEPARTUM CARE ONLY: CPT | Performed by: ADVANCED PRACTICE MIDWIFE

## 2024-03-13 PROCEDURE — 99213 OFFICE O/P EST LOW 20 MIN: CPT

## 2024-03-13 NOTE — PROGRESS NOTES
Eryn, , is at 39w2d, here for her [facility] visit.  Currently, she is feeling well. Denies 3rd trimester danger signs. Had a bought of contractions on  but they stopped. Wants SVE, no membrane sweep.     Vital signs and weight reviewed  See flowsheets  Platelets monica to 161 last week    Assessment/Plan: Care is up to date. Platelets reviewed. No need for another CBC at this time  Next visit: 1 week    Reviewed:   Prenatal visit schedule  Kick counts  Danger signs  Labor precautions    Pt verbalized understanding. All questions answered. No barriers to learning identified

## 2024-03-13 NOTE — PATIENT INSTRUCTIONS
Understanding Preeclampsia  Preeclampsia is high blood pressure (hypertension) that happens during pregnancy. It often shows up around the 20th week of pregnancy. It often goes back to normal by the 12th week after you give birth. It can lead to serious health risks for you and your baby. During your pregnancy, your healthcare provider will watch your blood pressure.    Symptoms  A common symptom of preeclampsia is high blood pressure. Other symptoms may include:  Rapid weight gain  Protein in your urine  Headache  Belly (abdominal) pain on your right side  Vision problems. These include flashes or spots.  Swelling (edema) in your face or hands. This also often happens near the end of normal pregnancies, even without preeclampsia.  Tests you may have  Your healthcare provider will want to check your blood pressure throughout your pregnancy. If your blood pressure is high, you may have the following tests:  Urine tests to look for protein  Blood tests to confirm preeclampsia  Fetal monitoring to make sure that your baby is healthy  Treating preeclampsia  You may need to take a daily low dose of aspirin if you are at risk for preeclampsia. Preeclampsia almost always ends soon after you give birth. Until then, your healthcare provider can help manage your condition. If your symptoms are mild, you may need activity limits at home, including bed rest and no heavy lifting. If your symptoms are severe, you will stay in the hospital. Hospital treatment includes:  Activity limits to help control blood pressure. This means no heavy lifting and 8 hours per day lying down with the feet up.  Magnesium IV (intravenous) drip during labor to prevent seizures  Induced labor or surgical delivery by  section. Delivery is considered the cure for preeclampsia.  When to call your healthcare provider  Call your healthcare provider if swelling, weight gain, or other symptoms come on quickly or are severe. Some cases of  preeclampsia are more severe than others. Your symptoms also may change or get worse as you get closer to your due date.  Who’s at risk?  No one knows what causes preeclampsia. Preeclampsia can happen in any pregnant woman. But it is more common in first-time pregnancies. Things that increase the risk include:  Previous pregnancies. You are at risk if you had preeclampsia, intrauterine growth retardation (IUGR),  birth, placental abruption, or fetal death in a past pregnancy.  Health history of mother. You are at risk if you have diabetes, high blood pressure, obesity, kidney disease, autoimmune disease such as lupus, or a family history of preeclampsia.  Current pregnancy. You are at risk if this is your first pregnancy, or if you have multiple fetuses, are younger than age 18 or older than 40, or used in vitro fertilization.  Race. You are at risk if you are black.  Dangers of preeclampsia  If not treated, preeclampsia can cause problems for you and your baby. The placenta is the organ that nourishes your baby. It may tear away from the uterine wall. This can put the baby at risk for health problems (fetal distress) and premature birth. Preeclampsia can also cause these health problems:  Kidney failure or other organ damage  Seizures  Stroke  Once you give birth  In most cases, preeclampsia goes away on its own soon after you give birth. This is often by the 12th week after you give deliver. Within days of delivery, your blood pressure, swelling, and other symptoms should get better. For some women, problems from preeclampsia can continue after delivery.  Postpartum preeclampsia that develops within the first 48 hours after delivery is rare. Another type of postpartum preeclampsia that develops more than 48 hours after delivery is called late-onset preeclampsia. It is also rare. Contact your healthcare provider right away if you have symptoms of preeclampsia after you deliver.  Leigh last reviewed this  educational content on 12/1/2019 © 2000-2020 [employer]. [address] [place] [address]. All rights reserved. This information is not intended as a substitute for professional medical care. Always follow your healthcare professional's instructions.        Kick Counts    It’s normal to worry about your baby’s health. One way you can know your baby’s doing well is to record the baby’s movements once a day. This is called a kick count. Remember to take your kick count records to all your appointments with your healthcare provider.  How to count kicks  Time how long it takes you to feel 10 kicks, flutters, swishes, or rolls. Ideally, you want to feel at least 10 movements within 2 hours. You will likely feel 10 movements in less time than that.  Starting at 28 weeks, count your baby's movements daily. Follow your healthcare provider's instructions for kick counting. Here are tips for counting kicks:  Choose a time when the baby is active, such as after a meal.   Sit comfortably or lie on your side.   The first time the baby moves, write down the time.   Count each movement until the baby has moved 10 times. This can take from 20 minutes to 2 hours.   If you have not felt 10 kicks by the end of the second hour, wait a few hours. Then try again.  Try to do it at the same time each day.  When to call your healthcare provider  Call your healthcare provider right away if:  You do a couple sets of kick counts during the day and your baby moves fewer than 10 times in 2 hours  Your baby moves much less often than on the days before.  You have not felt your baby move all day.  [employer] last reviewed this educational content on 12/1/2017 © 2000-2020 [employer]. [address] [place] [address]. All rights reserved. This information is not intended as a substitute for professional medical care. Always follow your healthcare professional's instructions.        Recognizing  Labor    The beginning of labor is the beginning of birth. You’ll start to feel strong contractions. That’s when the muscles of your uterus tighten up to help push your baby out during birth.  Yes, labor has probably started   Signs of labor include:  Your contractions are getting stronger and more painful instead of weaker. You’ll probably feel them throughout your whole uterus.  Your contractions are regular. This means that you feel them about every 5 to 10 minutes. And they are getting closer together.  You have pink-colored or blood-streaked fluid from your vagina.  You feel that the baby has \"dropped\" lower in your pelvis   Your water breaks. It may be a gush or a slow trickle of clear fluid from your vagina.  No, it’s probably not real labor   Signs of false labor include:  Your contractions aren’t regular or strong.  You feel the contractions only in your lower uterus.  Your contractions go away when you walk or change position.  Your contractions go away after drinking fluids.  When to call your healthcare provider  Call your healthcare provider or clinic right away if you notice any of these signs:  Fluid from your vagina, with or without contractions.  Bleeding heavy enough that you need a sanitary pad.  You don’t feel your baby moving as much as before.     NOTE: Contractions are timed by both of these measures:  The length of each contraction from its start to its finish.  How far apart the contractions are--the time between the start of one contraction and the start of the next contraction.   [employer] last reviewed this educational content on 10/1/2017  © 3519-0017 [employer]. [address] [place] [address], [place] [address]. All rights reserved. This information is not intended as a substitute for professional medical care. Always follow your healthcare professional's instructions.        Stages of Labor    Labor has 3 stages. Your healthcare provider may talk about the progress of your labor  with certain words. One of these is your baby’s position. Another is your baby’s station. And the effacement and dilation of your cervix will be noted. Read below to learn about these terms and the 3 stages of labor.  Your baby moves into position  Position is your baby’s placement in your uterus. Your baby may be facing left or right. He or she may be head first or feet first. Station refers to how far your baby has moved down into your pelvic cavity.  First stage of labor  During the first stage of labor, contractions of the uterus help your cervix thin (efface). They also help it widen (dilate). This will help your baby pass through the birth canal (vagina). At first your contractions will not come that often or last that long. But as time passes, they will come more often, they may be more painful, and they will last longer. They will last about 30 to 60 seconds each. The first stage of labor lasts until the cervix is fully dilated.  Second stage of labor  When your cervix is fully dilated, the second stage of labor begins. In this stage, you will have stronger contractions of your uterus that will help your baby move down the birth canal. They may happen every 2 to 5 minutes. They may last from 45 to 90 seconds each. Your healthcare provider will ask you to push with each contraction. Try to rest between the contractions if you can. Your baby is delivered at the end of this stage of labor.  Third stage of labor  The third stage of labor comes after your baby is born. This is when the afterbirth (placenta) comes out of your uterus. Your uterus will continue to contract. But the contractions are much milder than before.  [employer] last reviewed this educational content on 10/1/2017  © 1304-5770 [employer]. [address] [place] [address], [place] [address]. All rights reserved. This information is not intended as a substitute for professional medical care. Always follow your healthcare professional's  instructions.

## 2024-03-14 ENCOUNTER — HOSPITAL ENCOUNTER (INPATIENT)
Facility: HOSPITAL | Age: 34
LOS: 2 days | Discharge: HOME | End: 2024-03-16
Attending: ADVANCED PRACTICE MIDWIFE | Admitting: OBSTETRICS & GYNECOLOGY
Payer: COMMERCIAL

## 2024-03-14 ENCOUNTER — TELEPHONE (OUTPATIENT)
Dept: OBGYN CLINIC | Facility: CLINIC | Age: 34
End: 2024-03-14

## 2024-03-14 PROBLEM — Z37.9 NORMAL LABOR (HCC): Status: ACTIVE | Noted: 2024-03-14

## 2024-03-14 PROBLEM — Z34.90 PREGNANCY (HCC): Status: ACTIVE | Noted: 2024-03-14

## 2024-03-14 LAB
ANTIBODY SCREEN: NEGATIVE
BASOPHILS # BLD AUTO: 0.04 X10(3) UL (ref 0–0.2)
BASOPHILS NFR BLD AUTO: 0.3 %
DEPRECATED RDW RBC AUTO: 47.7 FL (ref 35.1–46.3)
EOSINOPHIL # BLD AUTO: 0.09 X10(3) UL (ref 0–0.7)
EOSINOPHIL NFR BLD AUTO: 0.6 %
ERYTHROCYTE [DISTWIDTH] IN BLOOD BY AUTOMATED COUNT: 14.6 % (ref 11–15)
HCT VFR BLD AUTO: 38.4 %
HGB BLD-MCNC: 12.9 G/DL
IMM GRANULOCYTES # BLD AUTO: 0.06 X10(3) UL (ref 0–1)
IMM GRANULOCYTES NFR BLD: 0.4 %
LYMPHOCYTES # BLD AUTO: 1.41 X10(3) UL (ref 1–4)
LYMPHOCYTES NFR BLD AUTO: 9.4 %
MCH RBC QN AUTO: 29.8 PG (ref 26–34)
MCHC RBC AUTO-ENTMCNC: 33.6 G/DL (ref 31–37)
MCV RBC AUTO: 88.7 FL
MONOCYTES # BLD AUTO: 0.93 X10(3) UL (ref 0.1–1)
MONOCYTES NFR BLD AUTO: 6.2 %
NEUTROPHILS # BLD AUTO: 12.51 X10 (3) UL (ref 1.5–7.7)
NEUTROPHILS # BLD AUTO: 12.51 X10(3) UL (ref 1.5–7.7)
NEUTROPHILS NFR BLD AUTO: 83.1 %
PLATELET # BLD AUTO: 145 10(3)UL (ref 150–450)
RBC # BLD AUTO: 4.33 X10(6)UL
RH BLOOD TYPE: POSITIVE
WBC # BLD AUTO: 15 X10(3) UL (ref 4–11)

## 2024-03-14 PROCEDURE — 59410 OBSTETRICAL CARE: CPT | Performed by: ADVANCED PRACTICE MIDWIFE

## 2024-03-14 RX ORDER — LIDOCAINE HYDROCHLORIDE 10 MG/ML
30 INJECTION, SOLUTION EPIDURAL; INFILTRATION; INTRACAUDAL; PERINEURAL ONCE
Status: COMPLETED | OUTPATIENT
Start: 2024-03-14 | End: 2024-03-14

## 2024-03-14 RX ORDER — ONDANSETRON 2 MG/ML
4 INJECTION INTRAMUSCULAR; INTRAVENOUS EVERY 6 HOURS PRN
Status: DISCONTINUED | OUTPATIENT
Start: 2024-03-14 | End: 2024-03-14

## 2024-03-14 RX ORDER — BENZOCAINE/MENTHOL 6 MG-10 MG
1 LOZENGE MUCOUS MEMBRANE EVERY 6 HOURS PRN
Status: DISCONTINUED | OUTPATIENT
Start: 2024-03-14 | End: 2024-03-16

## 2024-03-14 RX ORDER — SIMETHICONE 80 MG
80 TABLET,CHEWABLE ORAL 3 TIMES DAILY PRN
Status: DISCONTINUED | OUTPATIENT
Start: 2024-03-14 | End: 2024-03-16

## 2024-03-14 RX ORDER — SODIUM CHLORIDE, SODIUM LACTATE, POTASSIUM CHLORIDE, CALCIUM CHLORIDE 600; 310; 30; 20 MG/100ML; MG/100ML; MG/100ML; MG/100ML
INJECTION, SOLUTION INTRAVENOUS AS NEEDED
Status: DISCONTINUED | OUTPATIENT
Start: 2024-03-14 | End: 2024-03-14 | Stop reason: HOSPADM

## 2024-03-14 RX ORDER — ACETAMINOPHEN 500 MG
500 TABLET ORAL EVERY 6 HOURS PRN
Status: DISCONTINUED | OUTPATIENT
Start: 2024-03-14 | End: 2024-03-14

## 2024-03-14 RX ORDER — ACETAMINOPHEN 500 MG
500 TABLET ORAL EVERY 6 HOURS PRN
Status: DISCONTINUED | OUTPATIENT
Start: 2024-03-14 | End: 2024-03-16

## 2024-03-14 RX ORDER — LIDOCAINE HYDROCHLORIDE 10 MG/ML
INJECTION, SOLUTION EPIDURAL; INFILTRATION; INTRACAUDAL; PERINEURAL
Status: COMPLETED
Start: 2024-03-14 | End: 2024-03-14

## 2024-03-14 RX ORDER — ACETAMINOPHEN 500 MG
1000 TABLET ORAL EVERY 6 HOURS PRN
Status: DISCONTINUED | OUTPATIENT
Start: 2024-03-14 | End: 2024-03-14

## 2024-03-14 RX ORDER — BISACODYL 10 MG
10 SUPPOSITORY, RECTAL RECTAL ONCE AS NEEDED
Status: DISCONTINUED | OUTPATIENT
Start: 2024-03-14 | End: 2024-03-16

## 2024-03-14 RX ORDER — CITRIC ACID/SODIUM CITRATE 334-500MG
30 SOLUTION, ORAL ORAL AS NEEDED
Status: DISCONTINUED | OUTPATIENT
Start: 2024-03-14 | End: 2024-03-14 | Stop reason: HOSPADM

## 2024-03-14 RX ORDER — IBUPROFEN 600 MG/1
600 TABLET ORAL ONCE AS NEEDED
Status: DISCONTINUED | OUTPATIENT
Start: 2024-03-14 | End: 2024-03-14 | Stop reason: HOSPADM

## 2024-03-14 RX ORDER — IBUPROFEN 600 MG/1
600 TABLET ORAL EVERY 6 HOURS
Status: DISCONTINUED | OUTPATIENT
Start: 2024-03-14 | End: 2024-03-16

## 2024-03-14 RX ORDER — ACETAMINOPHEN 500 MG
1000 TABLET ORAL EVERY 6 HOURS PRN
Status: DISCONTINUED | OUTPATIENT
Start: 2024-03-14 | End: 2024-03-16

## 2024-03-14 RX ORDER — AMMONIA INHALANTS 0.04 G/.3ML
0.3 INHALANT RESPIRATORY (INHALATION) AS NEEDED
Status: DISCONTINUED | OUTPATIENT
Start: 2024-03-14 | End: 2024-03-16

## 2024-03-14 RX ORDER — DEXTROSE, SODIUM CHLORIDE, SODIUM LACTATE, POTASSIUM CHLORIDE, AND CALCIUM CHLORIDE 5; .6; .31; .03; .02 G/100ML; G/100ML; G/100ML; G/100ML; G/100ML
INJECTION, SOLUTION INTRAVENOUS CONTINUOUS
Status: DISCONTINUED | OUTPATIENT
Start: 2024-03-14 | End: 2024-03-14 | Stop reason: HOSPADM

## 2024-03-14 RX ORDER — TERBUTALINE SULFATE 1 MG/ML
0.25 INJECTION, SOLUTION SUBCUTANEOUS AS NEEDED
Status: DISCONTINUED | OUTPATIENT
Start: 2024-03-14 | End: 2024-03-14 | Stop reason: HOSPADM

## 2024-03-14 RX ORDER — ONDANSETRON 2 MG/ML
4 INJECTION INTRAMUSCULAR; INTRAVENOUS EVERY 6 HOURS PRN
Status: DISCONTINUED | OUTPATIENT
Start: 2024-03-14 | End: 2024-03-16

## 2024-03-14 RX ORDER — DOCUSATE SODIUM 100 MG/1
100 CAPSULE, LIQUID FILLED ORAL
Status: DISCONTINUED | OUTPATIENT
Start: 2024-03-14 | End: 2024-03-16

## 2024-03-14 NOTE — PROGRESS NOTES
Pt is a 33 year old female admitted to [facility]A.     Chief Complaint   Patient presents with    R/o Labor     Started having stronger contractions around 2pm      Pt is  39w2d intra-uterine pregnancy.  History obtained, consents signed. Oriented to room, staff, and plan of care.

## 2024-03-14 NOTE — TRIAGE
[facility]  part of [facility]      Triage Note    Eryn Penny Patient Status:  Outpatient    10/25/1990 MRN [id number]   Location [facility] Attending Audra Suarez CNM   Hosp Day # 0 PCP None Pcp      Para:   Estimated Date of Delivery: 3/18/24  Gestation: 39w2d    Chief Complaint    R/o Labor         Allergies:  No Known Allergies    No orders of the defined types were placed in this encounter.      Lab Results   Component Value Date    WBC 10.9 2024    HGB 12.0 2024    HCT 37.0 2024    .0 2024       Clinitek UA  Lab Results   Component Value Date    SPECGRAVITY 1.020 2021    URINECUL No Growth at 18-24 hrs. 2023       UA  Lab Results   Component Value Date    SPECGRAVITY 1.020 2021    NITRITE Negative 2021       Vitals:    24   BP:  111/74   Pulse:  94   Resp:  16   Temp:  98 °F (36.7 °C)   TempSrc:  Oral   Weight: 75.8 kg (167 lb)        NST  Variability: Moderate           Accelerations: Yes           Decelerations: None            Baseline: 125 BPM           Uterine Irritability: No           Contractions: Regular                    Contraction Frequency: 4-5 min apart, mild to palpation                   Acoustic Stimulator: No           Nonstress Test Interpretation: Reactive           Nonstress Test Second Interpretation: Reactive          FHR Category: Category I             Additional Comments   Pt states she started having stronger contractions at home. Upon arrival to triage, contractions became more mild and spaced out. Audra Suarez performed SVE, pt walked for one hour then wanted to be checked. No cervical change made. Labor precautions reviewed and pt discharged home in stable condition.     Chief Complaint   Patient presents with    R/o Labor     Started having stronger contractions around 2pm         Myra BARRETT RN  3/13/2024 10:21  PM

## 2024-03-14 NOTE — L&D DELIVERY NOTE
[facility]  part of [facility]    Vaginal Delivery Note    Eryn Penny Patient Status:  Inpatient    10/25/1990 MRN [id number]   Location [facility] Attending Angeline Caraballo CNM   Hosp Day # 0 PCP None Pcp     Delivery     Infant  Date of Delivery: 3/14/2024    Time of Delivery: 5:57 PM   Delivery Type: Normal spontaneous vaginal delivery     Infant Sex/Birthweight: female No birth weight on file.     Presentation Vertex [1]   Position Right [3]  Occiput [1]  Anterior [1]     Apgars:  1 minute: 8                5 minutes: 9                         10 minutes:      Placenta  Date/Time of Delivery: 3/14/2024  6:02 PM    Delivery: spontaneous  Placenta to Pathology: no  Cord Gases Submitted: no  Cord Blood Collection: yes  Cord Tissue Collection: yes  Cord Complications: none  Sponge and Needle Counts:  Verified yes    Maternal Anesthesia: none   Episiotomy/Laceration Repair  Laceration: none    Delivery Complications  none    Neonatologist Present: no  Delivery Comment: Uncomplicated vaginal delivery  Mother & baby in stable condition    Intake/Output   EBL:  250ml      Angeline Caraballo CNM   3/14/2024  6:31 PM

## 2024-03-14 NOTE — H&P
[facility]  part of [facility]    History & Physical    Eryn Penny Patient Status:  Inpatient    10/25/1990 MRN [id number]   Location [facility] Attending Angeline Caraballo CNM   Hosp Day # 0 Admitting Oneyda Yepez MD     Date of Admission:  3/14/2024      HPI:   Eryn Penny is 33 year old and  with current gestational age of 39w3d and estimated due date of 3/18/2024, by Last Menstrual Period consistent with ultrasound    Eryn is being admitted for labor management.    Her current obstetrical history is significant for thrombocytopenia.    Patient reports contractions .     Fetal Movement: normal.     History   Obstetric History:   OB History    Para Term  AB Living   3 3 3 0 0 3   SAB IAB Ectopic Multiple Live Births   0 0 0 0 3      # Outcome Date GA Lbr Joe/2nd Weight Sex Delivery Anes PTL Lv   3 Term 24 39w3d 04:51 / 00:06  F NORMAL SPONT None N EDILMA   2 Term 21 39w0d 06:59 / 00:12 7 lb 3.5 oz (3.275 kg) F NORMAL SPONT EPI N EDILMA   1 Term 19 39w2d 09:00 / 01:04 6 lb 8.8 oz (2.97 kg) F Vag-Spont EPI N EDILMA     Past Medical History:   Past Medical History:   Diagnosis Date    Precancerous skin lesion 2015     Past Social History:   Past Surgical History:   Procedure Laterality Date    BIOPSY OF SKIN LESION      WISDOM TEETH REMOVED Bilateral      Family History:   Family History   Problem Relation Age of Onset    Cancer Mother     Diabetes Maternal Grandfather      Social History:   Social History     Tobacco Use    Smoking status: Never    Smokeless tobacco: Never   Substance Use Topics    Alcohol use: Not Currently        Allergies/Medications:   Allergies:   No Known Allergies  Medications:  Medications Prior to Admission   Medication Sig Dispense Refill Last Dose    Ferrous Sulfate 325 (65 Fe) MG Oral Tab Take 1 tablet (325 mg total) by mouth every other day. 90 tablet 0 3/14/2024     prenatal vitamin with DHA 27-0.8-228 MG Oral Cap Take 1 capsule by mouth daily.   3/14/2024       Review of Systems:   Constitutional: denies fever, aches, chills  HEENT: denies visual changes  Skin: denies any unusual skin lesions or itching  Lungs: denies shortness of breath  Cardiovascular: denies chest pain  GI: denies abdominal pain,denies heartburn, denies constipation or diarrhea  : denies dysuria, pelvic pain, vaginal discharge or bleeding  Musculoskeletal: denies back or joint pain  Neuro denies headaches or dizziness  Psych: denies depression or anxiety    Physical Exam:   Temp:  [98 °F (36.7 °C)-98.1 °F (36.7 °C)] 98.1 °F (36.7 °C)  Pulse:  [80-98] 98  Resp:  [16-18] 18  BP: (111-119)/(71-82) 119/71    Constitutional: alert, appears stated age, and cooperative  Respiratory: clear, unlabored  Cardiac: regular rate and rhythm   Abdomen: soft, non-tender, EFW 7  Fetal Surveillance:  140 BPM; Fetal heart variability: moderate  Fetal Heart Rate decelerations: none  Fetal Heart Rate accelerations: yes  Uterine contractions: regular, every 2-3 minutes  Pelvis: Gynecoid  External Genitalia:  No lesions  Sterile vaginal exam: deferred  8 cm per triage RN  Musculoskeletal: steady gait  Reflexes: +1/+1  Psych:  Appropriate affect and speech    Results:     Prenatal Results       Initial       Test Value Reference Range Date Time    Blood Type (ABO Group)  O   09/04/23 0722    Rh Factor  Positive   09/04/23 0722    Antibody Screen (Required questions in OE to answer)  Negative   09/04/23 0722    HCT  34.8 % 35.0 - 48.0 11/07/23 0948       38.3 % 35.0 - 48.0 09/04/23 0722    HGB  11.8 g/dL 12.0 - 16.0 11/07/23 0948       12.8 g/dL 12.0 - 16.0 09/04/23 0722    MCV  91.1 fL 80.0 - 100.0 11/07/23 0948       91.8 fL 80.0 - 100.0 09/04/23 0722    Platelets  133.0 10(3)uL 150.0 - 450.0 11/07/23 0948       134.0 10(3)uL 150.0 - 450.0 09/04/23 0722    Rubella  Positive  Positive 09/04/23 0722    TREP  Negative  Negative  23 07    RPR ([facility])        Urine Culture  No Growth at 18-24 hrs.   23    Hepatitis B  Nonreactive  Nonreactive  23    HIV Combo  Non-Reactive  Non-Reactive 23    HCV  Nonreactive  Nonreactive  23 07              Optional Initial Labs       Test Value Reference Range Date Time    TSH        Pap Smear        HPV        GC DNA        Chlamydia DNA        GTT 1 Hr        Glucose Fasting        Glucose 1 Hr        Glucose 2 Hr        Glucose 3 Hr        HgB A1c        Vitamin D                  8-20 Weeks       Test Value Reference Range Date Time    1st Trimester Aneuploidy Risk Assessment        Quad - Down Screen Risk Estimate - prior to 18        Quad - Down Maternal Age Risk - prior to 18        Quad - Trisomy 18 screen Risk Estimate - prior to 18        AFP Spina Bifida (Required questions in OE to answer )        QUAD/AFP - Interpretation        Free Fetal DNA  ^ Low risk   23     Genetic testing        Genetic testing        Genetic testing        GC DNA  Negative  Negative 23 161    Chlamydia DNA  Negative  Negative 23 1618              24-28 Weeks       Test Value Reference Range Date Time    HCT  32.4 % 35.0 - 48.0 23 0828    HGB  10.5 g/dL 12.0 - 16.0 23 0828    Platelets  120.0 10(3)uL 150.0 - 450.0 23 0828    GTT 1 Hr  140 mg/dL See comment 23 0828    Glucose Fasting  84 mg/dL See comment 24 0658    Glucose 1 Hr  134 mg/dL See comment 24 0801    Glucose 2 Hr  114 mg/dL See comment 24 0902    Glucose 3 Hr  78 mg/dL 70 - <140 24 1000    TSH         Profile        Urine Culture        GC DNA        Chlamydia DNA                  35-37 Weeks       Test Value Reference Range Date Time    HCT  38.4 % 35.0 - 48.0 24 1734       37.0 % 35.0 - 48.0 24 0920       36.3 % 35.0 - 48.0 24 0845       35.2 % 35.0 - 48.0 24 0843    HGB  12.9 g/dL 12.0 - 16.0  24 1734       12.0 g/dL 12.0 - 16.0 24 0920       12.0 g/dL 12.0 - 16.0 24 0845       11.8 g/dL 12.0 - 16.0 24 0843    Platelets  145.0 10(3)uL 150.0 - 450.0 24 1734       151.0 10(3)uL 150.0 - 450.0 24 0920       132.0 10(3)uL 150.0 - 450.0 24 0845       146.0 10(3)uL 150.0 - 450.0 24 0843    TREP  Nonreactive  Nonreactive  23 08    RPR ([facility])        Genital Group B Culture  Negative  Negative 24 0951    TSH        Urine Culture        HIV Combo  Non-Reactive  Non-Reactive 23 0828    GC DNA        Chlamydia DNA                  Optional Labs       Test Value Reference Range Date Time    HgB A1c  5.1 % <5.7 24 0658    HGB Electrophoresis  (See Report)    23 0722    Varicella Zoster  131.30  >165.00 23 0722    Cystic Fibrosis-Old         Cystic Fibrosis[32] (Required questions in OE to answer)        Cystic Fibrosis[165] (Required questions in OE to answer)        Cystic Fibrosis[165] (Required questions in OE to answer)        Cystic Fibrosis[165] (Required questions in OE to answer)        Sickle Cell        24Hr Urine Protein        24Hr Urine Creatinine        Parvo B19 IgM        Parvo B19 IgG                  Legend    ^: Historical                            Reviewed all prenatal ultrasounds    Admit labs pending    Assessment/Plan:   Eryn is 33 year old and  with current gestational age of 39w3d and estimated due date of 3/18/2024, by Last Menstrual Period consistent with ultrasound    Active phase labor.  Category 1 FHR tracing      Admission problem(s):    Thrombocytopenia (HCC)     Admission plt 145      Pregnant (HCC)        Normal labor (HCC)        Anticipate           Risks, benefits, alternatives and possible complications have been discussed in detail with the patient.   Pre-admission, admission, and post admission procedures and expectations were discussed in detail.    All questions answered, all  appropriate consents will be signed at the Hospital. Admission is planned for today.   Anticipate vaginal delivery..    Angeline Caraballo CNM  3/14/2024  6:26 PM

## 2024-03-14 NOTE — TELEPHONE ENCOUNTER
Zhen on verbal.     States spouse was in triage yesterday due to ctx, got sent back home. Patient now experiencing ctx 3-4 in apart lasting 45 sec - 1 minute. Informed spouse and patient recommendation for [facility]. Verbalized understanding and agreed. Attempted to contact [facility] RN with no response.

## 2024-03-14 NOTE — PROGRESS NOTES
Orders received for discharge per MANDI overton. Pt discharged home in stable condition accompanied by SO with written and verbal labor precautions. PT verbalized understanding.

## 2024-03-15 PROBLEM — Z37.9 NORMAL LABOR (HCC): Status: RESOLVED | Noted: 2024-03-14 | Resolved: 2024-03-15

## 2024-03-15 PROBLEM — Z34.90 PREGNANT (HCC): Status: RESOLVED | Noted: 2024-02-19 | Resolved: 2024-03-15

## 2024-03-15 LAB
BASOPHILS # BLD AUTO: 0.04 X10(3) UL (ref 0–0.2)
BASOPHILS NFR BLD AUTO: 0.3 %
DEPRECATED RDW RBC AUTO: 48.7 FL (ref 35.1–46.3)
EOSINOPHIL # BLD AUTO: 0.13 X10(3) UL (ref 0–0.7)
EOSINOPHIL NFR BLD AUTO: 0.9 %
ERYTHROCYTE [DISTWIDTH] IN BLOOD BY AUTOMATED COUNT: 14.8 % (ref 11–15)
HCT VFR BLD AUTO: 35.1 %
HGB BLD-MCNC: 11.7 G/DL
IMM GRANULOCYTES # BLD AUTO: 0.09 X10(3) UL (ref 0–1)
IMM GRANULOCYTES NFR BLD: 0.6 %
LYMPHOCYTES # BLD AUTO: 2.29 X10(3) UL (ref 1–4)
LYMPHOCYTES NFR BLD AUTO: 15.2 %
MCH RBC QN AUTO: 29.9 PG (ref 26–34)
MCHC RBC AUTO-ENTMCNC: 33.3 G/DL (ref 31–37)
MCV RBC AUTO: 89.8 FL
MONOCYTES # BLD AUTO: 1.05 X10(3) UL (ref 0.1–1)
MONOCYTES NFR BLD AUTO: 7 %
NEUTROPHILS # BLD AUTO: 11.48 X10 (3) UL (ref 1.5–7.7)
NEUTROPHILS # BLD AUTO: 11.48 X10(3) UL (ref 1.5–7.7)
NEUTROPHILS NFR BLD AUTO: 76 %
PLATELET # BLD AUTO: 130 10(3)UL (ref 150–450)
RBC # BLD AUTO: 3.91 X10(6)UL
WBC # BLD AUTO: 15.1 X10(3) UL (ref 4–11)

## 2024-03-15 RX ORDER — IBUPROFEN 600 MG/1
600 TABLET ORAL EVERY 6 HOURS PRN
Qty: 40 TABLET | Refills: 0 | Status: SHIPPED | OUTPATIENT
Start: 2024-03-15 | End: 2024-03-25

## 2024-03-15 RX ORDER — CHOLECALCIFEROL (VITAMIN D3) 25 MCG
1 TABLET,CHEWABLE ORAL DAILY
Status: DISCONTINUED | OUTPATIENT
Start: 2024-03-15 | End: 2024-03-16

## 2024-03-15 RX ORDER — PSEUDOEPHEDRINE HCL 30 MG
100 TABLET ORAL 2 TIMES DAILY PRN
Qty: 20 CAPSULE | Refills: 0 | Status: SHIPPED | OUTPATIENT
Start: 2024-03-15 | End: 2024-03-25

## 2024-03-15 NOTE — LACTATION NOTE
This note was copied from a baby's chart.     03/15/24 1100   Evaluation Type   Evaluation Type Inpatient   Problems & Assessment   Infant Assessment Hunger cues present   Muscle tone Appropriate for GA   Feeding Assessment   Summary Current Feeding Breastfeeding exclusively   Breastfeeding Assessment Assisted with breastfeeding w/mother's permission;Sustained nutrititive latch w/audible swallows;Calm and ready to breastfeed;Coordinated suck/swallow;Tolerated feeding well;Deep latch achieved and observed   Breastfeeding lasted # of minutes 15   Breastfeeding Positions cross cradle;laid back;right breast;left breast   Latch 1   Audible Sucks/Swallows 2   Type of Nipple 2   Comfort (Breast/Nipple) 1   Hold (Positioning) 1   LATCH Score 7   Other (comment) Assisted with a latch in cross cradle and laid back position, was able to attain a deep latch, mom with a sore right nipple, gel pads given, encouraged to call if needed.

## 2024-03-15 NOTE — LACTATION NOTE
03/15/24 0945   Evaluation Type   Evaluation Type Inpatient   Problems identified   Problems identified Knowledge deficit   Maternal history   Other/comment thrombocytopenia   Breastfeeding goal   Breastfeeding goal To maintain breast milk feeding per patient goal   Maternal Assessment   Bilateral Breasts Soft;Symmetrical   Bilateral Nipples Everted;Elastic;Colostrum easily expressed   Prior breastfeeding experience (comment below) Multip;Unsuccessful   Prior BF experience: comment attempted, pumped 6 months   Breastfeeding Assistance Breastfeeding assistance provided with permission;Breast exam provided with permission;Hand expression provided with permission   Pain assessment   Pain, additional Pain location   Location/Comment slightly red on left side   Guidelines for use of:   Breast pump type Spectra   Current use of pump: not indicated at this time   Other (comment) Discussed the handbook, skin to skin, hand massage and expression, able to express drops, mom states that the left breast slightly sore, infant has been nursing well, encouraged to call to view a latch.

## 2024-03-15 NOTE — PROGRESS NOTES
[facility]  part of [facility]    OB/GYNE Progress Note      Eryn Penny Patient Status:  Inpatient    10/25/1990 MRN [id number]   Location [facility] 3SE Attending Angeline Caraballo CNM   Hosp Day # 1 PCP None Pcp        Assessment/Plan   Eryn Penny is a 33 year old , day 1 after a vaginal delivery  Breastfeeding without difficulty   Discharge home anticipated tomorrow morning  Postpartum teaching completed including danger signs and when to call the CNM       (normal spontaneous vaginal delivery) ([facility])  -- Stable, recovering well      Thrombocytopenia ([facility])  -- Platelets 130k this morning        Discussed with: nurse     patient and spouse     Plan discussed with patient who verbalizes understanding and agreement.        Subjective   Feels well, bleeding is decreasing. Denies excessive pain, not passing any clots. Moving around room without difficulty. Tolerating general diet without difficulty. Breastfeeding is going really well so far.   Support at home: , family  Has car seat     Review of Systems:  Constitutional: Denies dizziness/lightheadedness when walking around room.  Genitourinary: Denies excessive pain or bleeding, issues with urination  Respiratory: Denies shortness of breath or difficulty breathing. Denies chest pain.  Psychiatric: Denies depression        Objective   Vital signs in last 24 hours:  Temp:  [97.6 °F (36.4 °C)-98.7 °F (37.1 °C)] 97.6 °F (36.4 °C)  Pulse:  [65-98] 65  Resp:  [14-18] 16  BP: (108-132)/(62-84) 108/73    Input/Output:    Intake/Output Summary (Last 24 hours) at 3/15/2024 1035  Last data filed at 3/14/2024 2300  Gross per 24 hour   Intake --   Output 1100 ml   Net -1100 ml       Weight (Last 6):  Wt Readings from Last 6 Encounters:   24 167 lb (75.8 kg)   24 167 lb (75.8 kg)   24 167 lb (75.8 kg)   24 165 lb (74.8 kg)   24 164 lb (74.4 kg)   24 164 lb (74.4 kg)     Body mass index  is 26.95 kg/m².     Exam:  Constitutional: Comfortable and bonding well with infant   Uterus: Fundus below umbilicus   Wound/Incision: Well-approximated, no swelling or edema, small lochia rubra, no clots   Respiratory: Unlabored respirations   Breasts: Nipple/Areola normal bilaterally   Extremities: No edema   Psychiatric: Calm affect, appropriate     DVT Risk Score: Low risk        Results:     Lab Results   Component Value Date    TREPONEMALAB Nonreactive 12/29/2023    ABO O 03/14/2024    RH Positive 03/14/2024    WBC 15.1 (H) 03/15/2024    HGB 11.7 (L) 03/15/2024    HCT 35.1 03/15/2024    .0 (L) 03/15/2024       Lab Results   Component Value Date    SPECGRAVITY 1.020 05/21/2021    NITRITE Negative 05/21/2021       No results found.          Sandra Christine CNM  3/15/2024  10:35 AM

## 2024-03-15 NOTE — PLAN OF CARE
Problem: POSTPARTUM  Goal: Long Term Goal:Experiences normal postpartum course  Description: INTERVENTIONS:  - Assess and monitor vital signs and lab values.  - Assess fundus and lochia.  - Provide ice/sitz baths for perineum discomfort.  - Monitor healing of incision/episiotomy/laceration, and assess for signs and symptoms of infection and hematoma.  - Assess bladder function and monitor for bladder distention.  - Provide/instruct/assist with pericare as needed.  - Provide VTE prophylaxis as needed.  - Monitor bowel function.  - Encourage ambulation and provide assistance as needed.  - Assess and monitor emotional status and provide social service/psych resources as needed.  - Utilize standard precautions and use personal protective equipment as indicated. Ensure aseptic care of all intravenous lines and invasive tubes/drains.  - Obtain immunization and exposure to communicable diseases history.  Outcome: Progressing  Goal: Optimize infant feeding at the breast  Description: INTERVENTIONS:  - Initiate breast feeding within first hour after birth.   - Monitor effectiveness of current breast feeding efforts.  - Assess support systems available to mother/family.  - Identify cultural beliefs/practices regarding lactation, letdown techniques, maternal food preferences.  - Assess mother's knowledge and previous experience with breast feeding.  - Provide information as needed about early infant feeding cues (e.g., rooting, lip smacking, sucking fingers/hand) versus late cue of crying.  - Discuss/demonstrate breast feeding aids (e.g., infant sling, nursing footstool/pillows, and breast pumps).  - Encourage mother/other family members to express feelings/concerns, and actively listen.  - Educate father/SO about benefits of breast feeding and how to manage common lactation challenges.  - Recommend avoidance of specific medications or substances incompatible with breast feeding.  - Assess and monitor for signs of nipple  pain/trauma.  - Instruct and provide assistance with proper latch.  - Review techniques for milk expression (breast pumping) and storage of breast milk. Provide pumping equipment/supplies, instructions and assistance, as needed.  - Encourage rooming-in and breast feeding on demand.  - Encourage skin-to-skin contact.  - Provide LC support as needed.  - Assess for and manage engorgement.  - Provide breast feeding education handouts and information on community breast feeding support.   Outcome: Progressing  Goal: Establishment of adequate milk supply with medication/procedure interruptions  Description: INTERVENTIONS:  - Review techniques for milk expression (breast pumping).   - Provide pumping equipment/supplies, instructions, and assistance until it is safe to breastfeed infant.  Outcome: Progressing  Goal: Experiences normal breast weaning course  Description: INTERVENTIONS:  - Assess for and manage engorgement.  - Instruct on breast care.  - Provide comfort measures.  Outcome: Progressing  Goal: Appropriate maternal -  bonding  Description: INTERVENTIONS:  - Assess caregiver- interactions.  - Assess caregiver's emotional status and coping mechanisms.  - Encourage caregiver to participate in  daily care.  - Assess support systems available to mother/family.  - Provide /case management support as needed.  Outcome: Progressing     Problem: BIRTH - VAGINAL/ SECTION  Goal: Fetal and maternal status remain reassuring during the birth process  Description: INTERVENTIONS:  - Monitor vital signs  - Monitor fetal heart rate  - Monitor uterine activity  - Monitor labor progression (vaginal delivery)  - DVT prophylaxis (C/S delivery)  - Surgical antibiotic prophylaxis (C/S delivery)  Outcome: Completed     Problem: PAIN - ADULT  Goal: Verbalizes/displays adequate comfort level or patient's stated pain goal  Description: INTERVENTIONS:  - Encourage pt to monitor pain and request  assistance  - Assess pain using appropriate pain scale  - Administer analgesics based on type and severity of pain and evaluate response  - Implement non-pharmacological measures as appropriate and evaluate response  - Consider cultural and social influences on pain and pain management  - Manage/alleviate anxiety  - Utilize distraction and/or relaxation techniques  - Monitor for opioid side effects  - Notify MD/LIP if interventions unsuccessful or patient reports new pain  - Anticipate increased pain with activity and pre-medicate as appropriate  Outcome: Completed     Problem: ANXIETY  Goal: Will report anxiety at manageable levels  Description: INTERVENTIONS:  - Administer medication as ordered  - Teach and rehearse alternative coping skills  - Provide emotional support with 1:1 interaction with staff  Outcome: Completed     Problem: NORMAL   Goal: Experiences normal transition  Description: INTERVENTIONS:  - Assess and monitor vital signs and lab values.  - Encourage skin-to-skin with caregiver for thermoregulation  - Assess signs, symptoms and risk factors for hypoglycemia and follow protocol as needed.  - Assess signs, symptoms and risk factors for jaundice risk and follow protocol as needed.  - Utilize standard precautions and use personal protective equipment as indicated. Wash hands properly before and after each patient care activity.   - Ensure proper skin care and diapering and educate caregiver.  - Follow proper infant identification and infant security measures (secure access to the unit, provider ID, visiting policy, [employer] system), and educate caregiver.  - Ensure proper circumcision care and instruct/demonstrate to caregiver.  Outcome: Completed  Goal: Total weight loss less than 10% of birth weight  Description: INTERVENTIONS:  - Initiate breastfeeding within first hour after birth.   - Encourage rooming-in.  - Assess infant feedings.  - Monitor intake and output and daily weight.  -  Encourage maternal fluid intake for breastfeeding mother.  - Encourage feeding on-demand or as ordered per pediatrician.  - Educate caregiver on proper bottle-feeding technique as needed.  - Provide information about early infant feeding cues (e.g., rooting, lip smacking, sucking fingers/hand) versus late cue of crying.  - Review techniques for breastfeeding moms for expression (breast pumping) and storage of breast milk.  Outcome: Completed

## 2024-03-15 NOTE — DISCHARGE INSTRUCTIONS
Discharge Instructions for Vaginal Delivery  Follow-up  Schedule a  follow-up exam with the midwife who delivered you in 2 weeks and 6 weeks. Please remember to call as soon as possible for this appointment. After having a baby, your body may be very tired. It can take time to recover from a vaginal delivery. Please remember this and call if you have any questions or concerns before your 6 week appointment.   Medications    Please take Motrin at home for pain control 600mg every 6 hours as needed  Continue taking your Prenatal Vitamin daily, as long as you are nursing  Ferrous Sulfate 325 mg once every other day (may obtain in form of Gentle Iron, Slow FE, or liquid Floradix)  Vitamin D3 2000 IU daily  Colace (stool softener)  once or twice per day as needed  If you have stitches  You may have received stitches in the skin near your vagina. The stitches might have closed an episiotomy (an incision that enlarges the opening of the vagina). Or you may have needed stitches to repair torn skin. Either way, your stitches should dissolve within weeks. Until then, you can help reduce discomfort, aid healing, and reduce your risk of infection by keeping the stitches clean. These tips can help:  Gently wipe from front to back after you urinate or have a bowel movement.  After wiping, spray warm water on the area. Or you can have a sitz bath. This means sitting in a tub with a few inches of water in it. Then pat the area dry or use a hairdryer on a cool setting.  You can take a shower unless told not to.  Change sanitary pads at least every 2 to 4 hours.  Place cold packs as need, but remember to keep a thin towel between the pack and your skin.  Activity  Here are some suggestions:  Get lots of rest. Take naps as often as your can.   Increase your activities gradually.   Don’t have sexual intercourse until after you’ve had a follow-up appointment and you have decided on a birth control method.  Remember your are on  pelvic rest, so nothing in your vagina (tampons etc...), no tub baths, and no swimming pools.       When to call your healthcare provider  Call your health care provider right away if you have:  A fever of 100.4°F (38.0°C) or higher  Bleeding that requires a new sanitary pad after an hour, or large blood clots. Remember your bleeding will wax and wane. Please call if you are consistently saturating a pad and hour.   Pain in your vagina that gets worse and isn't relieved with medicine.  Burning, pain, red streaks, or lumpy areas in your breasts that may be accompanied by flu-like symptoms  Nausea or vomiting  Dizziness or fainting  Feelings of extreme sadness or anxiety, or a feeling that you don’t want to be with your baby  Abdominal pain that isn’t relieved with medicine  No bowel movement for 5 days  Redness, warmth, or pain in the lower leg  Chest pain        Discharge Instructions for Vaginal Delivery  Follow-up  Schedule a  follow-up exam with the midwife who delivered you in 2 weeks and 6 weeks. Please remember to call as soon as possible for this appointment. After having a baby, your body may be very tired. It can take time to recover from a vaginal delivery. Please remember this and call if you have any questions or concerns before your 6 week appointment.   Medications    Please take Motrin at home for pain control 600mg every 6 hours as needed  Continue taking your Prenatal Vitamin daily, as long as you are nursing  Ferrous Sulfate 325 mg once per day (may obtain in form of Gentle Iron, Slow FE, or liquid Floradix)  Vitamin D3 2000 IU daily  Colace (stool softener)  once or twice per day as needed  If you have stitches  You may have received stitches in the skin near your vagina. The stitches might have closed an episiotomy (an incision that enlarges the opening of the vagina). Or you may have needed stitches to repair torn skin. Either way, your stitches should dissolve within weeks. Until then,  you can help reduce discomfort, aid healing, and reduce your risk of infection by keeping the stitches clean. These tips can help:  Gently wipe from front to back after you urinate or have a bowel movement.  After wiping, spray warm water on the area. Or you can have a sitz bath. This means sitting in a tub with a few inches of water in it. Then pat the area dry or use a hairdryer on a cool setting.  You can take a shower unless told not to.  Change sanitary pads at least every 2 to 4 hours.  Place cold packs as need, but remember to keep a thin towel between the pack and your skin.  Activity  Here are some suggestions:  Get lots of rest. Take naps as often as your can.   Increase your activities gradually.   Don’t have sexual intercourse until after you’ve had a follow-up appointment and you have decided on a birth control method.  Remember your are on pelvic rest, so nothing in your vagina (tampons etc...), no tub baths, and no swimming pools.       When to call your healthcare provider  Call your health care provider right away if you have:  A fever of 100.4°F (38.0°C) or higher  Bleeding that requires a new sanitary pad after an hour, or large blood clots. Remember your bleeding will wax and wane. Please call if you are consistently saturating a pad and hour.   Pain in your vagina that gets worse and isn't relieved with medicine.  Burning, pain, red streaks, or lumpy areas in your breasts that may be accompanied by flu-like symptoms  Nausea or vomiting  Dizziness or fainting  Feelings of extreme sadness or anxiety, or a feeling that you don’t want to be with your baby  Abdominal pain that isn’t relieved with medicine  No bowel movement for 5 days  Redness, warmth, or pain in the lower leg  Chest pain

## 2024-03-15 NOTE — PLAN OF CARE
Problem: NORMAL   Goal: Experiences normal transition  Description: INTERVENTIONS:  - Assess and monitor vital signs and lab values.  - Encourage skin-to-skin with caregiver for thermoregulation  - Assess signs, symptoms and risk factors for hypoglycemia and follow protocol as needed.  - Assess signs, symptoms and risk factors for jaundice risk and follow protocol as needed.  - Utilize standard precautions and use personal protective equipment as indicated. Wash hands properly before and after each patient care activity.   - Ensure proper skin care and diapering and educate caregiver.  - Follow proper infant identification and infant security measures (secure access to the unit, provider ID, visiting policy, [employer] system), and educate caregiver.  - Ensure proper circumcision care and instruct/demonstrate to caregiver.  Outcome: Progressing  Goal: Total weight loss less than 10% of birth weight  Description: INTERVENTIONS:  - Initiate breastfeeding within first hour after birth.   - Encourage rooming-in.  - Assess infant feedings.  - Monitor intake and output and daily weight.  - Encourage maternal fluid intake for breastfeeding mother.  - Encourage feeding on-demand or as ordered per pediatrician.  - Educate caregiver on proper bottle-feeding technique as needed.  - Provide information about early infant feeding cues (e.g., rooting, lip smacking, sucking fingers/hand) versus late cue of crying.  - Review techniques for breastfeeding moms for expression (breast pumping) and storage of breast milk.  Outcome: Progressing

## 2024-03-15 NOTE — LACTATION NOTE
03/15/24 1100   Evaluation Type   Evaluation Type Inpatient   Problems identified   Problems identified Knowledge deficit   Maternal history   Other/comment thrombocytopenia   Breastfeeding goal   Breastfeeding goal To maintain breast milk feeding per patient goal   Maternal Assessment   Bilateral Breasts Soft;Symmetrical   Bilateral Nipples Everted;Elastic;Colostrum easily expressed;Sore   Right Nipple Sore   Prior breastfeeding experience (comment below) Multip;Unsuccessful   Prior BF experience: comment attempted, pumped 6 months   Breastfeeding Assistance Breastfeeding assistance provided with permission   Pain assessment   Pain, additional Pain location   Location/Comment slightly red on right side   Treatment of Sore Nipples Expressed breast milk;Deeper latch techniques;Hydrogel dressings as directed;Lanolin   Guidelines for use of:   Breast pump type Spectra   Current use of pump: not indicated at this time   Other (comment) Assisted with a latch in cross cradle and laid back position, was able to attain a deep latch, mom with a sore right nipple, gel pads given, encouraged to call if needed.

## 2024-03-16 VITALS
HEART RATE: 76 BPM | DIASTOLIC BLOOD PRESSURE: 71 MMHG | TEMPERATURE: 98 F | BODY MASS INDEX: 27 KG/M2 | SYSTOLIC BLOOD PRESSURE: 108 MMHG | RESPIRATION RATE: 16 BRPM | WEIGHT: 167 LBS

## 2024-03-16 NOTE — PLAN OF CARE

## 2024-03-16 NOTE — PLAN OF CARE
Discharge order received from MD.  Postpartum folder given, discharge medication form reviewed, signed and given to patient. ID Band matched with baby band. Patient informed when to make a follow-up appointment with OB. Mother is interacting appropriately with baby. Verbalized understanding of follow-up instructions. Discharged in stable condition, declined wheelchair.

## 2024-03-16 NOTE — LACTATION NOTE
Detail Level: Simple LACTATION NOTE - MOTHER      Evaluation Type: (P) Inpatient    Problems identified  Problems identified: (P) Knowledge deficit    Maternal history  Other/comment: (P) thrombocytopenia    Breastfeeding goal  Breastfeeding goal: (P) To maintain breast milk feeding per patient goal    Maternal Assessment  Bilateral Breasts: Soft;Symmetrical  Bilateral Nipples: Everted;Elastic;Colostrum easily expressed;Sore  Right Nipple: Sore  Prior breastfeeding experience (comment below): (P) Multip;Unsuccessful  Prior BF experience: comment: (P) attempted, pumped 6 months  Breastfeeding Assistance: (P) Breastfeeding assistance provided with permission    Pain assessment  Pain, additional: (P) Pain location  Location/Comment: (P) slightly red on right side, feeling better today per mom  Treatment of Sore Nipples: Expressed breast milk;Deeper latch techniques;Hydrogel dressings as directed;Lanolin    Guidelines for use of:  Breast pump type: (P) Spectra  Current use of pump:: (P) not indicated at this time  Other (comment): (P) Mom states that infant has been breastfeeding well, was up a lot in the night to nurse, states that she feels that breastfeeding is going well, discussed the [place] breastfeeding center, encouraged to call if needed.               Instructions: This plan will send the code FBSD to the PM system.  DO NOT or CHANGE the price. Price (Do Not Change): 0.00

## 2024-03-16 NOTE — PROGRESS NOTES
[facility]  part of [facility]    OB/GYNE Progress Note      Eryn Penny Patient Status:  Inpatient    10/25/1990 MRN [id number]   Location [facility] 3SE Attending Angeline Caraballo CNM   Hosp Day # 2 PCP None Pcp        Assessment/Plan   Eryn Penny is a 33 year old , day 2 after a vaginal delivery  Breastfeeding without difficulty, considering supplementation after discharge   Discharge home anticipated this morning  Postpartum teaching completed including breast care and supplementation, danger signs and when to call the CNM       (normal spontaneous vaginal delivery) ([facility])  -- Stable, recovering well      Thrombocytopenia ([facility])  -- Platelets 130k yesterday        Discussed with:      patient and spouse     Plan discussed with patient who verbalizes understanding and agreement.        Subjective   Feeling good, is ready to go home. Bleeding is decreasing. Denies excessive pain, not passing any clots. Moving around room without difficulty. Tolerating general diet without difficulty. Breastfeeding is going well. However, baby was frequently cluster feeding overnight. Eryn desires supplementing with formula once she gets home    Support at home: , mother   Has car seat     Review of Systems:  Constitutional: Denies dizziness/lightheadedness when walking around room.  Genitourinary: Denies excessive pain or bleeding, issues with urination  Respiratory: Denies shortness of breath or difficulty breathing. Denies chest pain.  Psychiatric: Denies depression        Objective   Vital signs in last 24 hours:  Temp:  [98.1 °F (36.7 °C)] 98.1 °F (36.7 °C)  Pulse:  [72] 72  Resp:  [16] 16  BP: (108)/(69) 108/69    Input/Output:  No intake or output data in the 24 hours ending 24 0821      Weight (Last 6):  Wt Readings from Last 6 Encounters:   24 167 lb (75.8 kg)   24 167 lb (75.8 kg)   24 167 lb (75.8 kg)   24 165 lb (74.8 kg)    02/27/24 164 lb (74.4 kg)   02/19/24 164 lb (74.4 kg)     Body mass index is 26.95 kg/m².     Exam:  Constitutional: Comfortable and bonding well with infant   Uterus: Fundus firm, below umbilicus   Wound/Incision: no swelling or edema, small lochia rubra, no clots   Respiratory: Unlabored respirations   Extremities: No edema. No evidence of DVT on exam  Psychiatric: Calm affect, appropriate     DVT Risk Score: Low risk        Results:     Lab Results   Component Value Date    TREPONEMALAB Nonreactive 12/29/2023    ABO O 03/14/2024    RH Positive 03/14/2024    WBC 15.1 (H) 03/15/2024    HGB 11.7 (L) 03/15/2024    HCT 35.1 03/15/2024    .0 (L) 03/15/2024       Lab Results   Component Value Date    SPECGRAVITY 1.020 05/21/2021    NITRITE Negative 05/21/2021       No results found.          WILMAR Marvin under direct supervision of Julia Abad CNM    Patient seen in conjunction with WILMAR. I personally witnessed the patient's exam and medical decision making on this date of service.  I was physically present in the room for the performance of the E/M service.  I have reviewed the MANDI student's documentation and findings including history, Exam, and Medical Decision Making, edited the document for accuracy and verify that it represents the clinical findings and services performed.   Julia Abad CNM

## 2024-03-16 NOTE — DISCHARGE SUMMARY
[facility]  part of [facility]    Discharge Summary    Eryn Penny Patient Status:  Inpatient    10/25/1990 MRN [id number]   Location [facility] 3SE Attending Angeline Caraballo CNM   Hosp Day # 2       Delivering OB Clinician: Angeline Caraballo CNM    EDC: Estimated Date of Delivery: 3/18/24    Gestational Age: 39w3d    Antepartum complications:   Patient Active Problem List   Diagnosis    Thrombocytopenia (HCC)    Pregnancy (HCC)     (normal spontaneous vaginal delivery) (HCC)       Date of Delivery: 3/14/2024  Time of Delivery: 5:57 PM     Delivery Type: spontaneous vaginal delivery    Baby: Liveborn female   Information for the patient's :  Hemalatha Girl [[id number]]   7 lb 11.5 oz (3.5 kg)   Apgars:  1 minute: 8   5 minutes: 9 10 minutes:       Intrapartum Complications: thrombocytopenia, plts 145    Admit Date: 3/14/2024    Discharge Date: 3/16/2024    Hospital Course: No complications Routine delivery and postpartum care    Discharged Condition: stable    Disposition: home    Plan:     Follow-up appointment in 2 weeks with Angeline Caraballo CNM

## 2024-03-20 PROBLEM — O47.9 UTERINE CONTRACTIONS DURING PREGNANCY (HCC): Status: ACTIVE | Noted: 2024-03-20

## 2024-04-16 ENCOUNTER — TELEPHONE (OUTPATIENT)
Dept: OBGYN UNIT | Facility: HOSPITAL | Age: 34
End: 2024-04-16

## 2024-04-24 ENCOUNTER — POSTPARTUM (OUTPATIENT)
Dept: OBGYN CLINIC | Facility: CLINIC | Age: 34
End: 2024-04-24

## 2024-04-24 VITALS
BODY MASS INDEX: 22.66 KG/M2 | HEART RATE: 62 BPM | HEIGHT: 66 IN | WEIGHT: 141 LBS | DIASTOLIC BLOOD PRESSURE: 83 MMHG | SYSTOLIC BLOOD PRESSURE: 115 MMHG

## 2024-04-24 DIAGNOSIS — N81.89 PELVIC FLOOR WEAKNESS IN FEMALE: ICD-10-CM

## 2024-04-24 PROBLEM — O47.9 UTERINE CONTRACTIONS DURING PREGNANCY (HCC): Status: RESOLVED | Noted: 2024-03-20 | Resolved: 2024-04-24

## 2024-04-24 PROBLEM — Z34.90 PREGNANCY (HCC): Status: RESOLVED | Noted: 2024-03-14 | Resolved: 2024-04-24

## 2024-04-24 PROBLEM — D69.6 THROMBOCYTOPENIA (HCC): Status: RESOLVED | Noted: 2023-09-05 | Resolved: 2024-04-24

## 2024-04-24 PROCEDURE — 96127 BRIEF EMOTIONAL/BEHAV ASSMT: CPT | Performed by: ADVANCED PRACTICE MIDWIFE

## 2024-06-17 ENCOUNTER — PATIENT MESSAGE (OUTPATIENT)
Dept: OBGYN CLINIC | Facility: CLINIC | Age: 34
End: 2024-06-17

## 2024-06-17 DIAGNOSIS — N81.89 PELVIC FLOOR RELAXATION: Primary | ICD-10-CM

## 2024-06-17 NOTE — TELEPHONE ENCOUNTER
From: Eryn Penny  To: Angeline Caraballo  Sent: 6/17/2024 9:41 AM CDT  Subject: Pelvic Floor Therapy Order    Hi Angeline!    I have found a pelvic floor therapist near me rather than going out to [facility]. They do need an order sent to them to accept me as a patient for therapy. Are you able to fax them an order for me?    Here is their fax/contact information:  Main Number: [phone]  TTY: [id number]  Main Fax: [phone]    Thank you so much!  Eryn

## 2024-11-30 NOTE — PROGRESS NOTES
Feeling well. Has less fatigue. Denies pelvic pain. Had some light spotting yesterday when wiping. [place] two days before. No blood on speculum exam. Cervix closed. Vaginal culture and GC/CT collected and sent. Physical exam WNL. Pap up to date. Mild thrombocytopenia on initial labs. She states she had the same in her other pregnancies but resolved in third trimester. Uncomplicated previous pregnancies and deliveries. Reviewed warning signs and when to call.  SHONA 4wks - - -

## (undated) NOTE — LETTER
[facility]  Administration of Anesthesia  I, Eryn Penny agree to be cared for by a physician anesthesiologist alone and/or with a nurse anesthetist, who is specially trained to monitor me and give me medicine to put me to sleep or keep me comfortable during my procedure    I understand that my anesthesiologist and/or anesthetist is not an employee or agent of [facility] or [employer]. He or she works for [facility]    As the patient asking for anesthesia services, I agree to:  Allow the anesthesiologist (anesthesia doctor) to give me medicine and do additional procedures as necessary. Some examples are: Starting or using an “IV” to give me medicine, fluids or blood during my procedure, and having a breathing tube placed to help me breathe when I’m asleep (intubation). In the event that my heart stops working properly, I understand that my anesthesiologist will make every effort to sustain my life, unless otherwise directed by [facility] Do Not Resuscitate documents.  Tell my anesthesia doctor before my procedure:  If I am pregnant.  The last time that I ate or drank.  iii. All of the medicines I take (including prescriptions, herbal supplements, and pills I can buy without a prescription (including street drugs/illegal medications). Failure to inform my anesthesiologist about these medicines may increase my risk of anesthetic complications.  iv.If I am allergic to anything or have had a reaction to anesthesia before.  I understand how the anesthesia medicine will help me (benefits).  I understand that with any type of anesthesia medicine there are risks:  The most common risks are: nausea, vomiting, sore throat, muscle soreness, damage to my eyes, mouth, or teeth (from breathing tube placement).  Rare risks include: remembering what happened during my procedure, allergic reactions to medications, injury to my airway, heart, lungs, vision, nerves, or  muscles and in extremely rare instances death.  My doctor has explained to me other choices available to me for my care (alternatives).  Pregnant Patients (“epidural”):  I understand that the risks of having an epidural (medicine given into my back to help control pain during labor), include itching, low blood pressure, difficulty urinating, headache or slowing of the baby’s heart. Very rare risks include infection, bleeding, seizure, irregular heart rhythms and nerve injury.  Regional Anesthesia (“spinal”, “epidural”, & “nerve blocks”):  I understand that rare but potential complications include headache, bleeding, infection, seizure, irregular heart rhythms, and nerve injury.    _____________________________________________________________________________  Patient (or Representative) Signature/Relationship to Patient  Date   Time    _____________________________________________________________________________   Name (if used)    Language/Organization   Time    _____________________________________________________________________________  Nurse Anesthetist Signature     Date   Time  _____________________________________________________________________________  Anesthesiologist Signature     Date   Time  I have discussed the procedure and information above with the patient (or patient’s representative) and answered their questions. The patient or their representative has agreed to have anesthesia services.    _____________________________________________________________________________  Witness        Date   Time  I have verified that the signature is that of the patient or patient’s representative, and that it was signed before the procedure  Patient Name: Eryn BANKS Hemalatha     : 10/25/1990                 Printed: 3/14/2024 at 5:17 PM    Medical Record #: [id number]                                            Page 1 of 1  ----------ANESTHESIA CONSENT----------

## (undated) NOTE — LETTER
[facility]  Administration of Anesthesia  1. Margoth Campo, or _________________________________ acting on her behalf, (Patient) (Dependent/Representative) request to receive anesthesia for my pending procedure/operation/treatment. bleeding, seizure, cardiac arrest and death. 7. AWARENESS: I understand that it is possible (but unlikely) to have explicit memory of events from the operating room while under general anesthesia.   8. ELECTROCONVULSIVE THERAPY PATIENTS: This consent serve below affirms that prior to the time of the procedure, I have explained to the patient and/or his/her guardian, the risks and benefits of undergoing anesthesia, as well as any reasonable alternatives.     ___________________________________________________

## (undated) NOTE — LETTER
[facility]  Administration of Anesthesia  I, Eryn Penny agree to be cared for by a physician anesthesiologist alone and/or with a nurse anesthetist, who is specially trained to monitor me and give me medicine to put me to sleep or keep me comfortable during my procedure    I understand that my anesthesiologist and/or anesthetist is not an employee or agent of [facility] or [employer]. He or she works for [facility]    As the patient asking for anesthesia services, I agree to:  Allow the anesthesiologist (anesthesia doctor) to give me medicine and do additional procedures as necessary. Some examples are: Starting or using an “IV” to give me medicine, fluids or blood during my procedure, and having a breathing tube placed to help me breathe when I’m asleep (intubation). In the event that my heart stops working properly, I understand that my anesthesiologist will make every effort to sustain my life, unless otherwise directed by [facility] Do Not Resuscitate documents.  Tell my anesthesia doctor before my procedure:  If I am pregnant.  The last time that I ate or drank.  iii. All of the medicines I take (including prescriptions, herbal supplements, and pills I can buy without a prescription (including street drugs/illegal medications). Failure to inform my anesthesiologist about these medicines may increase my risk of anesthetic complications.  iv.If I am allergic to anything or have had a reaction to anesthesia before.  I understand how the anesthesia medicine will help me (benefits).  I understand that with any type of anesthesia medicine there are risks:  The most common risks are: nausea, vomiting, sore throat, muscle soreness, damage to my eyes, mouth, or teeth (from breathing tube placement).  Rare risks include: remembering what happened during my procedure, allergic reactions to medications, injury to my airway, heart, lungs, vision, nerves, or  muscles and in extremely rare instances death.  My doctor has explained to me other choices available to me for my care (alternatives).  Pregnant Patients (“epidural”):  I understand that the risks of having an epidural (medicine given into my back to help control pain during labor), include itching, low blood pressure, difficulty urinating, headache or slowing of the baby’s heart. Very rare risks include infection, bleeding, seizure, irregular heart rhythms and nerve injury.  Regional Anesthesia (“spinal”, “epidural”, & “nerve blocks”):  I understand that rare but potential complications include headache, bleeding, infection, seizure, irregular heart rhythms, and nerve injury.    _____________________________________________________________________________  Patient (or Representative) Signature/Relationship to Patient  Date   Time    _____________________________________________________________________________   Name (if used)    Language/Organization   Time    _____________________________________________________________________________  Nurse Anesthetist Signature     Date   Time  _____________________________________________________________________________  Anesthesiologist Signature     Date   Time  I have discussed the procedure and information above with the patient (or patient’s representative) and answered their questions. The patient or their representative has agreed to have anesthesia services.    _____________________________________________________________________________  Witness        Date   Time  I have verified that the signature is that of the patient or patient’s representative, and that it was signed before the procedure  Patient Name: Eryn BANKS Hemalatha     : 10/25/1990                 Printed: 3/13/2024 at 8:15 PM    Medical Record #: [id number]                                            Page 1 of 1  ----------ANESTHESIA CONSENT----------

## (undated) NOTE — LETTER
[facility]  [address] [address]  Ph:[phone]  Fax:[phone]        Patient Information:  Patient Name:   Address: Eryn Penny, ([id number])  [address] [place] [address] [place] [address] [phone] (home)  Sex: female          : 10/25/1990   ________________________________________________________________  Referral Information:  Order Date: 2024       Epic Order #: [id number]   Referral Type: Physical Therapy Referral - External Dx: Pelvic floor relaxation (N81.89)   Signed Referral [place]:        Comments: Treatment: Physical Therapy Evaluate & Treat  Physician goals: Pain relief, Increased Function, Activities of daily living and Education  Region Specific: Pelvic Floor Physical Therapy     Frequency: 1-2 times per week  Duration: 6-8 weeks  Number of visits: 10-12 visit or per therapists recommendations     Please Call Vero Landaverde at [phone] to schedule your evaluation.      3 Office Locations:   Ne[facility]sciences Physical Therapy Department   [place] [facility] [address]   [address] [place] [address]      [address] [address] [phone]      [place] [facility] (For Pre and Post [place] Appointments Only)  [address]   [place] [address]         Feel free to call and speak with a therapist with any questions prior to evaluation.        What to expect:   You will have an internal examination as well as general screening to determine your optimal treatment plan.  Number of Visits: 1           ______________________________________________________________  Scheduling Information:           **REFERRAL REQUEST**     Your physician has referred you to a specialist.  Your physician or the clinic staff will provide you with the phone number you should call to schedule your appointment.      If you are confident that your benefit plan will not require a referral  or authorization, such as [place] Medicaid, please feel free to schedule your appointment immediately. However, if you are unsure about the requirements for authorization, please wait 5-7 days and then contact your physician's office. At that time, you will be provided with any authorization numbers or be assured that none are required. You can then schedule your appointment. Failure to obtain required authorization numbers can create reimbursement difficulties for you.     _______________________________________________________        Coverage Information:      Active Insurance as of 6/17/2024              Primary Coverage               Payor Plan Insurance Group Employer/Plan Group      [employer]ET[employer] INS [employer]ET[employer] POS [id number]                Payor Address Payor Phone Number Payor Fax Number Effective Dates      [address] [phone]   7/23/2020 - None Entered      [place] [id number]                    Subscriber Name Subscriber Birth Date Member ID           NANCY THURMANSUJEYSUSSY 10/25/1990 [id number]                Guarantor Name (ID) Guarantor Birth Date Guarantor Address Guarantor Type      GIOVANNI TALAMANTES ([id number]) 10/25/1990 [address] Personal/Family          [place] [address]                    Electronically Signed By: Angeline Caraballo CNM [NPI: [id number]]  Order Date: Jun 17, 2024 at 12:25 PM

## (undated) NOTE — LETTER
Dear New Mom,    We hope you are doing well. If, for any reason, you have questions or concerns about your health or your baby’s health, please contact your provider or your pediatrician or family medicine physician regarding your baby.     At [facility], we feel that postpartum support is very important for new families. Please see the enclosed new parent support flyer that lists support programs and resources with both in-person and online options.     Additionally, our Breastfeeding Centers at [facility] and [facility] in [place], offer outpatient visits with our International Board-Certified Lactation   Consultants (IBCLCs) for any breastfeeding concerns or questions you may have.    For issues related to stress, anxiety or depression, we have a Nurturing Mom support group that meets both in-person or online.  There’s also a 24-hour Mom’s Line where you can request a phone call from a clinical therapist for assistance for postpartum depression.    We encourage you to take advantage of these programs and resources as you recover from childbirth and learn to care for your new infant.    Best wishes,    [facility]            [id number]

## (undated) NOTE — LETTER
VACCINE ADMINISTRATION RECORD  PARENT / GUARDIAN APPROVAL  Date: 2023  Vaccine administered to: Karan Muhammad     : 10/25/1990    MRN: [id number]    A copy of the appropriate Centers for Disease Control and Prevention Vaccine Information statement has been provided. I have read or have had explained the information about the diseases and the vaccines listed below. There was an opportunity to ask questions and any questions were answered satisfactorily. I believe that I understand the benefits and risks of the vaccine cited and ask that the vaccine(s) listed below be given to me or to the person named above (for whom I am authorized to make this request). VACCINES ADMINISTERED:  Tdap    I have read and hereby agree to be bound by the terms of this agreement as stated above. My signature is valid until revoked by me in writing. This document is signed by Lizbeth Morales, relationship: Self on 2023.:                                                                                                                                         Parent / Guardian Signature                                                Date    Earlyne Mouse served as a witness to authentication that the identity of the person signing electronically is in fact the person represented as signing.

## (undated) NOTE — LETTER
Dear new mom:    We've missed you! The nurses of [facility] have tried to reach you by phone to ask if you had any questions regarding your health or the care of your new little one.     Please feel free to call your doctor with

## (undated) NOTE — LETTER
[facility]  Administration of Anesthesia  1. Regina Rome, or _________________________________ acting on her behalf, (Patient) (Dependent/Representative) request to receive anesthesia for my pending procedure/operation/treatment. bleeding, seizure, cardiac arrest and death. 7. AWARENESS: I understand that it is possible (but unlikely) to have explicit memory of events from the operating room while under general anesthesia.   8. ELECTROCONVULSIVE THERAPY PATIENTS: This consent serve below affirms that prior to the time of the procedure, I have explained to the patient and/or his/her guardian, the risks and benefits of undergoing anesthesia, as well as any reasonable alternatives.     ___________________________________________________